# Patient Record
Sex: MALE | Race: BLACK OR AFRICAN AMERICAN | NOT HISPANIC OR LATINO | Employment: FULL TIME | URBAN - METROPOLITAN AREA
[De-identification: names, ages, dates, MRNs, and addresses within clinical notes are randomized per-mention and may not be internally consistent; named-entity substitution may affect disease eponyms.]

---

## 2017-05-12 RX ORDER — AMLODIPINE BESYLATE 5 MG/1
5 TABLET ORAL DAILY
Qty: 90 TABLET | Refills: 3 | Status: SHIPPED | OUTPATIENT
Start: 2017-05-12 | End: 2017-12-18

## 2017-12-01 DIAGNOSIS — Z12.11 COLON CANCER SCREENING: ICD-10-CM

## 2017-12-18 ENCOUNTER — OFFICE VISIT (OUTPATIENT)
Dept: FAMILY MEDICINE | Facility: CLINIC | Age: 52
End: 2017-12-18
Payer: COMMERCIAL

## 2017-12-18 VITALS
TEMPERATURE: 98 F | SYSTOLIC BLOOD PRESSURE: 144 MMHG | HEIGHT: 74 IN | HEART RATE: 76 BPM | DIASTOLIC BLOOD PRESSURE: 80 MMHG | OXYGEN SATURATION: 99 %

## 2017-12-18 DIAGNOSIS — R73.9 HYPERGLYCEMIA: ICD-10-CM

## 2017-12-18 DIAGNOSIS — N52.9 ERECTILE DYSFUNCTION, UNSPECIFIED ERECTILE DYSFUNCTION TYPE: ICD-10-CM

## 2017-12-18 DIAGNOSIS — Z00.00 WELLNESS EXAMINATION: Primary | ICD-10-CM

## 2017-12-18 DIAGNOSIS — I10 HYPERTENSION, UNSPECIFIED TYPE: ICD-10-CM

## 2017-12-18 DIAGNOSIS — Z80.42 FAMILY HISTORY OF PROSTATE CANCER: ICD-10-CM

## 2017-12-18 PROCEDURE — 99396 PREV VISIT EST AGE 40-64: CPT | Mod: 25,S$GLB,, | Performed by: FAMILY MEDICINE

## 2017-12-18 PROCEDURE — 90715 TDAP VACCINE 7 YRS/> IM: CPT | Mod: S$GLB,,, | Performed by: FAMILY MEDICINE

## 2017-12-18 PROCEDURE — 90471 IMMUNIZATION ADMIN: CPT | Mod: S$GLB,,, | Performed by: FAMILY MEDICINE

## 2017-12-18 PROCEDURE — 90472 IMMUNIZATION ADMIN EACH ADD: CPT | Mod: S$GLB,,, | Performed by: FAMILY MEDICINE

## 2017-12-18 PROCEDURE — 90686 IIV4 VACC NO PRSV 0.5 ML IM: CPT | Mod: S$GLB,,, | Performed by: FAMILY MEDICINE

## 2017-12-18 RX ORDER — AMLODIPINE BESYLATE 5 MG/1
5 TABLET ORAL DAILY
COMMUNITY
End: 2018-06-19

## 2017-12-18 RX ORDER — SILDENAFIL 100 MG/1
100 TABLET, FILM COATED ORAL DAILY PRN
Qty: 30 TABLET | Refills: 2 | Status: SHIPPED | OUTPATIENT
Start: 2017-12-18 | End: 2019-03-25 | Stop reason: SDUPTHER

## 2017-12-18 RX ORDER — MOMETASONE FUROATE 1 MG/ML
SOLUTION TOPICAL DAILY
Qty: 60 ML | Refills: 11 | Status: SHIPPED | OUTPATIENT
Start: 2017-12-18 | End: 2018-04-24 | Stop reason: SDUPTHER

## 2017-12-18 NOTE — PROGRESS NOTES
Subjective:      Patient ID: Kike Swartz is a 52 y.o. male.    Chief Complaint: Annual Exam; Hip Pain (X 1 week); and Health Risk Assessment (Due: flu & tetanus vaccine, Colonscopy )    Wellness, rash and needs to refill Rx from dermatologist, cream helped, not engouh, right groin pain, needs prostate checked, Dad had cancer; can't keep erection;       Hip Pain        Review of Systems   Constitutional: Negative for appetite change, fatigue, fever and unexpected weight change.   HENT: Negative for congestion, ear pain, sinus pressure and sore throat.    Eyes: Negative for pain and visual disturbance.   Respiratory: Negative for shortness of breath.    Cardiovascular: Negative for chest pain.   Gastrointestinal: Negative for abdominal pain, constipation and diarrhea.   Endocrine: Negative for polyuria.   Genitourinary: Negative for difficulty urinating and frequency.        ED   Musculoskeletal: Negative for arthralgias, back pain and myalgias.   Skin: Positive for rash. Negative for color change.   Allergic/Immunologic: Negative.    Neurological: Negative for syncope, weakness and headaches.   Hematological: Does not bruise/bleed easily.   Psychiatric/Behavioral: Negative for dysphoric mood and suicidal ideas. The patient is not nervous/anxious.    All other systems reviewed and are negative.    Objective:     Physical Exam   Constitutional: He is oriented to person, place, and time. He appears well-developed and well-nourished. No distress.   HENT:   Head: Normocephalic and atraumatic.   Right Ear: External ear normal.   Left Ear: External ear normal.   Mouth/Throat: Oropharynx is clear and moist. No oropharyngeal exudate.   Eyes: Conjunctivae and EOM are normal. Pupils are equal, round, and reactive to light. Right eye exhibits no discharge. Left eye exhibits no discharge. No scleral icterus.   Neck: Normal range of motion. Neck supple. No JVD present. No tracheal deviation present. No thyromegaly present.    Cardiovascular: Normal rate and regular rhythm.  Exam reveals no gallop and no friction rub.    No murmur heard.  Pulmonary/Chest: Effort normal and breath sounds normal. No stridor. No respiratory distress. He has no wheezes. He has no rales. He exhibits no tenderness.   Abdominal: Soft. He exhibits no distension and no mass. There is no tenderness. There is no rebound and no guarding.   Musculoskeletal: Normal range of motion. He exhibits no edema or tenderness.   Lymphadenopathy:     He has no cervical adenopathy.   Neurological: He is alert and oriented to person, place, and time. He has normal reflexes. He displays normal reflexes. No cranial nerve deficit. He exhibits normal muscle tone. Coordination normal.   Skin: Skin is warm and dry. No rash noted. He is not diaphoretic. No erythema. No pallor.   Hyperpigmented right groin rash   Psychiatric: He has a normal mood and affect. His behavior is normal. Judgment and thought content normal.   Nursing note and vitals reviewed.    Assessment:     1. Wellness examination    2. Hypertension, unspecified type    3. Hyperglycemia    4. Erectile dysfunction, unspecified erectile dysfunction type    5. Family history of prostate cancer      Plan:     New Prescriptions    ASPIRIN (ECOTRIN) 81 MG EC TABLET    Take 1 tablet (81 mg total) by mouth once daily.    ATORVASTATIN (LIPITOR) 10 MG TABLET    Take 1 tablet (10 mg total) by mouth once daily.    CIPROFLOXACIN HCL (CIPRO) 500 MG TABLET    Take 1 tablet (500 mg total) by mouth 2 (two) times daily.    METFORMIN (GLUCOPHAGE) 500 MG TABLET    Take 1 tablet (500 mg total) by mouth daily with breakfast.    MOMETASONE (ELOCON) 0.1 % LOTION    Apply topically once daily.    SILDENAFIL (VIAGRA) 100 MG TABLET    Take 1 tablet (100 mg total) by mouth daily as needed for Erectile Dysfunction.     Discontinued Medications    AMLODIPINE (NORVASC) 5 MG TABLET    Take 1 tablet (5 mg total) by mouth once daily.     Modified  Medications    No medications on file       Wellness examination  -     CBC auto differential; Future; Expected date: 12/18/2017  -     Comprehensive metabolic panel; Future; Expected date: 12/18/2017  -     Hemoglobin A1c; Future  -     Lipid panel; Future  -     PSA, Screening; Future  -     TSH; Future  -     Urinalysis; Future    Hypertension, unspecified type  -     CBC auto differential; Future; Expected date: 12/18/2017  -     Comprehensive metabolic panel; Future; Expected date: 12/18/2017  -     Hemoglobin A1c; Future  -     Lipid panel; Future  -     PSA, Screening; Future  -     TSH; Future  -     Urinalysis; Future    Hyperglycemia  -     CBC auto differential; Future; Expected date: 12/18/2017  -     Comprehensive metabolic panel; Future; Expected date: 12/18/2017  -     Hemoglobin A1c; Future  -     Lipid panel; Future  -     PSA, Screening; Future  -     TSH; Future  -     Urinalysis; Future    Erectile dysfunction, unspecified erectile dysfunction type    Family history of prostate cancer    Other orders  -     sildenafil (VIAGRA) 100 MG tablet; Take 1 tablet (100 mg total) by mouth daily as needed for Erectile Dysfunction.  Dispense: 30 tablet; Refill: 2  -     mometasone (ELOCON) 0.1 % lotion; Apply topically once daily.  Dispense: 60 mL; Refill: 11  -     Influenza - Quadrivalent (3 years & older) (PF)  -     Tdap Vaccine

## 2017-12-27 ENCOUNTER — TELEPHONE (OUTPATIENT)
Dept: FAMILY MEDICINE | Facility: CLINIC | Age: 52
End: 2017-12-27

## 2017-12-27 ENCOUNTER — LAB VISIT (OUTPATIENT)
Dept: LAB | Facility: HOSPITAL | Age: 52
End: 2017-12-27
Attending: FAMILY MEDICINE
Payer: COMMERCIAL

## 2017-12-27 DIAGNOSIS — R73.9 HYPERGLYCEMIA: ICD-10-CM

## 2017-12-27 DIAGNOSIS — I10 HYPERTENSION, UNSPECIFIED TYPE: ICD-10-CM

## 2017-12-27 DIAGNOSIS — Z00.00 WELLNESS EXAMINATION: ICD-10-CM

## 2017-12-27 LAB
ALBUMIN SERPL BCP-MCNC: 4.7 G/DL
ALP SERPL-CCNC: 82 U/L
ALT SERPL W/O P-5'-P-CCNC: 33 U/L
ANION GAP SERPL CALC-SCNC: 11 MMOL/L
AST SERPL-CCNC: 29 U/L
BASOPHILS # BLD AUTO: 0.03 K/UL
BASOPHILS NFR BLD: 0.7 %
BILIRUB SERPL-MCNC: 0.5 MG/DL
BUN SERPL-MCNC: 15 MG/DL
CALCIUM SERPL-MCNC: 9.6 MG/DL
CHLORIDE SERPL-SCNC: 100 MMOL/L
CHOLEST SERPL-MCNC: 253 MG/DL
CHOLEST/HDLC SERPL: 3 {RATIO}
CO2 SERPL-SCNC: 32 MMOL/L
COMPLEXED PSA SERPL-MCNC: 2 NG/ML
CREAT SERPL-MCNC: 0.95 MG/DL
DIFFERENTIAL METHOD: ABNORMAL
EOSINOPHIL # BLD AUTO: 0.2 K/UL
EOSINOPHIL NFR BLD: 5.5 %
ERYTHROCYTE [DISTWIDTH] IN BLOOD BY AUTOMATED COUNT: 13.3 %
EST. GFR  (AFRICAN AMERICAN): >60 ML/MIN/1.73 M^2
EST. GFR  (NON AFRICAN AMERICAN): >60 ML/MIN/1.73 M^2
ESTIMATED AVG GLUCOSE: 123 MG/DL
GLUCOSE SERPL-MCNC: 99 MG/DL
HBA1C MFR BLD HPLC: 5.9 %
HCT VFR BLD AUTO: 40.4 %
HDLC SERPL-MCNC: 83 MG/DL
HDLC SERPL: 32.8 %
HGB BLD-MCNC: 13.3 G/DL
LDLC SERPL CALC-MCNC: 154.6 MG/DL
LYMPHOCYTES # BLD AUTO: 1.4 K/UL
LYMPHOCYTES NFR BLD: 32.7 %
MCH RBC QN AUTO: 30 PG
MCHC RBC AUTO-ENTMCNC: 32.9 G/DL
MCV RBC AUTO: 91 FL
MONOCYTES # BLD AUTO: 0.5 K/UL
MONOCYTES NFR BLD: 10.5 %
NEUTROPHILS # BLD AUTO: 2.2 K/UL
NEUTROPHILS NFR BLD: 50.6 %
NONHDLC SERPL-MCNC: 170 MG/DL
PLATELET # BLD AUTO: 264 K/UL
PMV BLD AUTO: 10.5 FL
POTASSIUM SERPL-SCNC: 4.4 MMOL/L
PROT SERPL-MCNC: 8.4 G/DL
RBC # BLD AUTO: 4.43 M/UL
SODIUM SERPL-SCNC: 143 MMOL/L
TRIGL SERPL-MCNC: 77 MG/DL
TSH SERPL DL<=0.005 MIU/L-ACNC: 1.59 UIU/ML
WBC # BLD AUTO: 4.4 K/UL

## 2017-12-27 PROCEDURE — 85025 COMPLETE CBC W/AUTO DIFF WBC: CPT | Mod: PO

## 2017-12-27 PROCEDURE — 80061 LIPID PANEL: CPT

## 2017-12-27 PROCEDURE — 80053 COMPREHEN METABOLIC PANEL: CPT | Mod: PO

## 2017-12-27 PROCEDURE — 83036 HEMOGLOBIN GLYCOSYLATED A1C: CPT | Mod: PO

## 2017-12-27 PROCEDURE — 84443 ASSAY THYROID STIM HORMONE: CPT | Mod: PO

## 2017-12-27 PROCEDURE — 36415 COLL VENOUS BLD VENIPUNCTURE: CPT | Mod: PO

## 2017-12-27 PROCEDURE — 84153 ASSAY OF PSA TOTAL: CPT

## 2017-12-27 RX ORDER — ATORVASTATIN CALCIUM 10 MG/1
10 TABLET, FILM COATED ORAL DAILY
Qty: 90 TABLET | Refills: 3 | Status: SHIPPED | OUTPATIENT
Start: 2017-12-27 | End: 2019-09-25

## 2017-12-27 RX ORDER — METFORMIN HYDROCHLORIDE 500 MG/1
500 TABLET ORAL
Qty: 90 TABLET | Refills: 3 | Status: SHIPPED | OUTPATIENT
Start: 2017-12-27 | End: 2021-09-27 | Stop reason: SDUPTHER

## 2017-12-27 RX ORDER — CIPROFLOXACIN 500 MG/1
500 TABLET ORAL 2 TIMES DAILY
Qty: 20 TABLET | Refills: 0 | Status: SHIPPED | OUTPATIENT
Start: 2017-12-27 | End: 2018-01-06

## 2017-12-27 RX ORDER — ASPIRIN 81 MG/1
81 TABLET ORAL DAILY
Qty: 100 TABLET | Refills: 12 | COMMUNITY
Start: 2017-12-27 | End: 2023-11-15

## 2017-12-28 ENCOUNTER — TELEPHONE (OUTPATIENT)
Dept: INTERNAL MEDICINE | Facility: CLINIC | Age: 52
End: 2017-12-28

## 2017-12-28 PROBLEM — N52.9 ERECTILE DYSFUNCTION: Status: ACTIVE | Noted: 2017-12-28

## 2017-12-28 PROBLEM — R73.9 HYPERGLYCEMIA: Status: ACTIVE | Noted: 2017-12-28

## 2017-12-28 PROBLEM — I10 HYPERTENSION: Status: ACTIVE | Noted: 2017-12-28

## 2017-12-28 PROBLEM — Z80.42 FAMILY HISTORY OF PROSTATE CANCER: Status: ACTIVE | Noted: 2017-12-28

## 2017-12-28 NOTE — TELEPHONE ENCOUNTER
----- Message from Laith Marie MD sent at 12/27/2017  9:56 PM CST -----  Slight urine infection; sending antibiotic to pharmacy

## 2017-12-28 NOTE — TELEPHONE ENCOUNTER
Patient returned call. Advised of infection in urine & that Rx sent to pharmacy. Patient understood

## 2017-12-28 NOTE — TELEPHONE ENCOUNTER
Patient has slight urine infection medication called into pharmacy.----- Message from Laith Marie MD sent at 12/27/2017 10:05 PM CST -----  A1C high, has pre diabetes; cholesterol too high; needs to avoid sugar, sweets, white starches and start metformin 500 mg and atorvstatin 10 mg and aspirin 81 mg daily and rpeat labs 6 months

## 2018-03-30 RX ORDER — AMLODIPINE BESYLATE 5 MG/1
TABLET ORAL
Qty: 90 TABLET | Refills: 0 | Status: SHIPPED | OUTPATIENT
Start: 2018-03-30 | End: 2018-06-19

## 2018-04-25 RX ORDER — MOMETASONE FUROATE 1 MG/ML
SOLUTION TOPICAL DAILY
Qty: 60 ML | Refills: 11 | Status: SHIPPED | OUTPATIENT
Start: 2018-04-25 | End: 2018-05-05

## 2018-06-18 DIAGNOSIS — R73.9 HYPERGLYCEMIA: Primary | ICD-10-CM

## 2018-06-18 DIAGNOSIS — E78.5 HYPERLIPIDEMIA, UNSPECIFIED HYPERLIPIDEMIA TYPE: ICD-10-CM

## 2018-06-19 PROBLEM — E78.5 HYPERLIPIDEMIA: Status: ACTIVE | Noted: 2018-06-19

## 2018-06-19 RX ORDER — AMLODIPINE BESYLATE 5 MG/1
TABLET ORAL
Qty: 90 TABLET | Refills: 1 | Status: SHIPPED | OUTPATIENT
Start: 2018-06-19 | End: 2018-11-15 | Stop reason: SDUPTHER

## 2018-11-18 RX ORDER — AMLODIPINE BESYLATE 5 MG/1
5 TABLET ORAL DAILY
Qty: 90 TABLET | Refills: 1 | Status: SHIPPED | OUTPATIENT
Start: 2018-11-18 | End: 2019-05-20 | Stop reason: SDUPTHER

## 2018-11-18 RX ORDER — AMLODIPINE BESYLATE 5 MG/1
5 TABLET ORAL DAILY
Qty: 90 TABLET | Refills: 1 | Status: SHIPPED | OUTPATIENT
Start: 2018-11-18 | End: 2019-02-18 | Stop reason: SDUPTHER

## 2019-02-18 RX ORDER — AMLODIPINE BESYLATE 5 MG/1
5 TABLET ORAL DAILY
Qty: 90 TABLET | Refills: 1 | Status: CANCELLED | OUTPATIENT
Start: 2019-02-18

## 2019-02-19 RX ORDER — AMLODIPINE BESYLATE 5 MG/1
5 TABLET ORAL DAILY
Qty: 90 TABLET | Refills: 0 | Status: SHIPPED | OUTPATIENT
Start: 2019-02-19 | End: 2019-09-18

## 2019-03-25 RX ORDER — SILDENAFIL 100 MG/1
100 TABLET, FILM COATED ORAL DAILY PRN
Qty: 30 TABLET | Refills: 2 | Status: SHIPPED | OUTPATIENT
Start: 2019-03-25 | End: 2019-09-18 | Stop reason: SDUPTHER

## 2019-05-20 NOTE — LETTER
May 21, 2019    Kike Coe[address] [employer]   [facility] [id number]             [facility]  [address] [address]  Phone: [phone]  Fax: [phone] Dear Mr. Swartz:    Our records indicate that it is time for you to return to clinic for evaluation by Dr. Marie. Please contact our office to schedule an appointment.          Sincerely,        Dr. Marie's staff

## 2019-05-21 RX ORDER — AMLODIPINE BESYLATE 5 MG/1
5 TABLET ORAL DAILY
Qty: 90 TABLET | Refills: 0 | Status: SHIPPED | OUTPATIENT
Start: 2019-05-21 | End: 2019-08-20 | Stop reason: SDUPTHER

## 2019-08-21 RX ORDER — AMLODIPINE BESYLATE 5 MG/1
5 TABLET ORAL DAILY
Qty: 90 TABLET | Refills: 0 | Status: SHIPPED | OUTPATIENT
Start: 2019-08-21 | End: 2019-09-18 | Stop reason: SDUPTHER

## 2019-09-04 ENCOUNTER — PATIENT OUTREACH (OUTPATIENT)
Dept: ADMINISTRATIVE | Facility: HOSPITAL | Age: 54
End: 2019-09-04

## 2019-09-18 ENCOUNTER — OFFICE VISIT (OUTPATIENT)
Dept: FAMILY MEDICINE | Facility: CLINIC | Age: 54
End: 2019-09-18
Payer: COMMERCIAL

## 2019-09-18 VITALS
SYSTOLIC BLOOD PRESSURE: 138 MMHG | TEMPERATURE: 98 F | HEIGHT: 74 IN | WEIGHT: 193.25 LBS | OXYGEN SATURATION: 96 % | DIASTOLIC BLOOD PRESSURE: 70 MMHG | HEART RATE: 71 BPM | BODY MASS INDEX: 24.8 KG/M2

## 2019-09-18 DIAGNOSIS — I10 HYPERTENSION, UNSPECIFIED TYPE: ICD-10-CM

## 2019-09-18 DIAGNOSIS — Z23 FLU VACCINE NEED: ICD-10-CM

## 2019-09-18 DIAGNOSIS — N52.9 ERECTILE DYSFUNCTION, UNSPECIFIED ERECTILE DYSFUNCTION TYPE: ICD-10-CM

## 2019-09-18 DIAGNOSIS — Z00.00 WELLNESS EXAMINATION: Primary | ICD-10-CM

## 2019-09-18 DIAGNOSIS — M51.37 DEGENERATION OF INTERVERTEBRAL DISC AT L5-S1 LEVEL: ICD-10-CM

## 2019-09-18 DIAGNOSIS — R73.9 HYPERGLYCEMIA: ICD-10-CM

## 2019-09-18 DIAGNOSIS — Z80.42 FAMILY HISTORY OF PROSTATE CANCER: ICD-10-CM

## 2019-09-18 DIAGNOSIS — R21 RASH: ICD-10-CM

## 2019-09-18 DIAGNOSIS — E78.5 HYPERLIPIDEMIA, UNSPECIFIED HYPERLIPIDEMIA TYPE: ICD-10-CM

## 2019-09-18 PROBLEM — M51.379 DEGENERATION OF INTERVERTEBRAL DISC AT L5-S1 LEVEL: Status: ACTIVE | Noted: 2019-09-18

## 2019-09-18 PROCEDURE — 90686 FLU VACCINE (QUAD) GREATER THAN OR EQUAL TO 3YO PRESERVATIVE FREE IM: ICD-10-PCS | Mod: S$GLB,,, | Performed by: FAMILY MEDICINE

## 2019-09-18 PROCEDURE — 99396 PR PREVENTIVE VISIT,EST,40-64: ICD-10-PCS | Mod: 25,S$GLB,, | Performed by: FAMILY MEDICINE

## 2019-09-18 PROCEDURE — 90471 FLU VACCINE (QUAD) GREATER THAN OR EQUAL TO 3YO PRESERVATIVE FREE IM: ICD-10-PCS | Mod: S$GLB,,, | Performed by: FAMILY MEDICINE

## 2019-09-18 PROCEDURE — 3078F DIAST BP <80 MM HG: CPT | Mod: CPTII,S$GLB,, | Performed by: FAMILY MEDICINE

## 2019-09-18 PROCEDURE — 99396 PREV VISIT EST AGE 40-64: CPT | Mod: 25,S$GLB,, | Performed by: FAMILY MEDICINE

## 2019-09-18 PROCEDURE — 3075F SYST BP GE 130 - 139MM HG: CPT | Mod: CPTII,S$GLB,, | Performed by: FAMILY MEDICINE

## 2019-09-18 PROCEDURE — 90686 IIV4 VACC NO PRSV 0.5 ML IM: CPT | Mod: S$GLB,,, | Performed by: FAMILY MEDICINE

## 2019-09-18 PROCEDURE — 3075F PR MOST RECENT SYSTOLIC BLOOD PRESS GE 130-139MM HG: ICD-10-PCS | Mod: CPTII,S$GLB,, | Performed by: FAMILY MEDICINE

## 2019-09-18 PROCEDURE — 3078F PR MOST RECENT DIASTOLIC BLOOD PRESSURE < 80 MM HG: ICD-10-PCS | Mod: CPTII,S$GLB,, | Performed by: FAMILY MEDICINE

## 2019-09-18 PROCEDURE — 90471 IMMUNIZATION ADMIN: CPT | Mod: S$GLB,,, | Performed by: FAMILY MEDICINE

## 2019-09-18 RX ORDER — AMLODIPINE BESYLATE 5 MG/1
5 TABLET ORAL DAILY
Qty: 90 TABLET | Refills: 3 | Status: SHIPPED | OUTPATIENT
Start: 2019-09-18 | End: 2019-11-21 | Stop reason: SDUPTHER

## 2019-09-18 RX ORDER — MOMETASONE FUROATE 1 MG/G
CREAM TOPICAL DAILY
Qty: 45 TUBE | Refills: 5 | Status: SHIPPED | OUTPATIENT
Start: 2019-09-18 | End: 2019-11-21 | Stop reason: SDUPTHER

## 2019-09-18 RX ORDER — SILDENAFIL 100 MG/1
100 TABLET, FILM COATED ORAL DAILY PRN
Qty: 30 TABLET | Refills: 2 | Status: SHIPPED | OUTPATIENT
Start: 2019-09-18 | End: 2020-04-08 | Stop reason: SDUPTHER

## 2019-09-18 NOTE — PROGRESS NOTES
"Subjective:      Patient ID: Kike Swartz is a 54 y.o. male.    Chief Complaint: wellness      Vitals:    09/18/19 1350   BP: 138/70   Pulse: 71   Temp: 97.7 °F (36.5 °C)   SpO2: 96%   Weight: 87.6 kg (193 lb 3.7 oz)   Height: 6' 2" (1.88 m)        HPI   Wellness; last visit one year ago    Review of Systems   Constitutional: Negative for activity change and unexpected weight change.   HENT: Positive for rhinorrhea. Negative for hearing loss and trouble swallowing.    Eyes: Negative for discharge and visual disturbance.   Respiratory: Negative for chest tightness and wheezing.    Cardiovascular: Negative for chest pain and palpitations.   Gastrointestinal: Negative for blood in stool, constipation, diarrhea and vomiting.   Endocrine: Negative for polydipsia and polyuria.   Genitourinary: Negative for difficulty urinating, hematuria and urgency.   Musculoskeletal: Positive for arthralgias. Negative for joint swelling and neck pain.        Right iliac crest   Skin: Positive for rash.   Neurological: Negative for weakness and headaches.   Psychiatric/Behavioral: Negative for confusion and dysphoric mood.     Objective:     Physical Exam   Constitutional: He is oriented to person, place, and time. He appears well-developed and well-nourished. No distress.   HENT:   Head: Normocephalic and atraumatic.   Right Ear: External ear normal.   Left Ear: External ear normal.   Mouth/Throat: Oropharynx is clear and moist. No oropharyngeal exudate.   Eyes: Pupils are equal, round, and reactive to light. Conjunctivae and EOM are normal. Right eye exhibits no discharge. Left eye exhibits no discharge. No scleral icterus.   Neck: Normal range of motion. Neck supple. No JVD present. No tracheal deviation present. No thyromegaly present.   Cardiovascular: Normal rate and regular rhythm. Exam reveals no gallop and no friction rub.   No murmur heard.  Pulmonary/Chest: Effort normal and breath sounds normal. No stridor. No respiratory " distress. He has no wheezes. He has no rales. He exhibits no tenderness.   Abdominal: Soft. He exhibits no distension and no mass. There is no tenderness. There is no rebound and no guarding.   Musculoskeletal: Normal range of motion. He exhibits no edema or tenderness.   Lymphadenopathy:     He has no cervical adenopathy.   Neurological: He is alert and oriented to person, place, and time. He has normal reflexes. He displays normal reflexes. No cranial nerve deficit. He exhibits normal muscle tone. Coordination normal.   Skin: Skin is warm and dry. No rash noted. He is not diaphoretic. No erythema. No pallor.   Raised rash right prox ant thigh   Psychiatric: He has a normal mood and affect. His behavior is normal. Judgment and thought content normal.   Nursing note and vitals reviewed.    Assessment:     1. Wellness examination    2. Flu vaccine need    3. Hypertension, unspecified type    4. Hyperglycemia    5. Hyperlipidemia, unspecified hyperlipidemia type    6. Family history of prostate cancer    7. Degeneration of intervertebral disc at L5-S1 level    8. Erectile dysfunction, unspecified erectile dysfunction type    9. Rash      Plan:        Medication List           Accurate as of 9/18/19  2:23 PM. If you have any questions, ask your nurse or doctor.               CHANGE how you take these medications    amLODIPine 5 MG tablet  Commonly known as:  NORVASC  Take 1 tablet (5 mg total) by mouth once daily.  What changed:  Another medication with the same name was removed. Continue taking this medication, and follow the directions you see here.  Changed by:  Laith Marie MD     * mometasone 0.1 % solution  Commonly known as:  ELOCON  Apply topically once daily.  What changed:  Another medication with the same name was added. Make sure you understand how and when to take each.  Changed by:  Laith Marie MD     * mometasone 0.1% 0.1 % cream  Commonly known as:  ELOCON  Apply topically once daily.  What  changed:  You were already taking a medication with the same name, and this prescription was added. Make sure you understand how and when to take each.  Changed by:  Laith Marie MD         * This list has 2 medication(s) that are the same as other medications prescribed for you. Read the directions carefully, and ask your doctor or other care provider to review them with you.            CONTINUE taking these medications    aspirin 81 MG EC tablet  Commonly known as:  ECOTRIN  Take 1 tablet (81 mg total) by mouth once daily.     atorvastatin 10 MG tablet  Commonly known as:  LIPITOR  Take 1 tablet (10 mg total) by mouth once daily.     metFORMIN 500 MG tablet  Commonly known as:  GLUCOPHAGE  Take 1 tablet (500 mg total) by mouth daily with breakfast.     sildenafil 100 MG tablet  Commonly known as:  VIAGRA  Take 1 tablet (100 mg total) by mouth daily as needed for Erectile Dysfunction.           Where to Get Your Medications      These medications were sent to [facility] [id number] [facility]  [address] [place] [address]    Phone:  [phone]   · amLODIPine 5 MG tablet  · mometasone 0.1% 0.1 % cream  · sildenafil 100 MG tablet       Wellness examination  -     CBC auto differential; Future; Expected date: 09/18/2019  -     Comprehensive metabolic panel; Future; Expected date: 09/18/2019  -     Lipid panel; Future  -     Urinalysis; Future  -     Vitamin D; Future  -     TSH; Future  -     PSA, Screening; Future  -     Hemoglobin A1c; Future    Flu vaccine need  -     Influenza - Quadrivalent (PF)  -     CBC auto differential; Future; Expected date: 09/18/2019  -     Comprehensive metabolic panel; Future; Expected date: 09/18/2019  -     Lipid panel; Future  -     Urinalysis; Future  -     Vitamin D; Future  -     TSH; Future  -     PSA, Screening; Future  -     Hemoglobin A1c; Future    Hypertension, unspecified type  -     CBC auto differential; Future; Expected date:  09/18/2019  -     Comprehensive metabolic panel; Future; Expected date: 09/18/2019  -     Lipid panel; Future  -     Urinalysis; Future  -     Vitamin D; Future  -     TSH; Future  -     PSA, Screening; Future  -     Hemoglobin A1c; Future    Hyperglycemia  -     CBC auto differential; Future; Expected date: 09/18/2019  -     Comprehensive metabolic panel; Future; Expected date: 09/18/2019  -     Lipid panel; Future  -     Urinalysis; Future  -     Vitamin D; Future  -     TSH; Future  -     PSA, Screening; Future  -     Hemoglobin A1c; Future    Hyperlipidemia, unspecified hyperlipidemia type  -     CBC auto differential; Future; Expected date: 09/18/2019  -     Comprehensive metabolic panel; Future; Expected date: 09/18/2019  -     Lipid panel; Future  -     Urinalysis; Future  -     Vitamin D; Future  -     TSH; Future  -     PSA, Screening; Future  -     Hemoglobin A1c; Future    Family history of prostate cancer  -     CBC auto differential; Future; Expected date: 09/18/2019  -     Comprehensive metabolic panel; Future; Expected date: 09/18/2019  -     Lipid panel; Future  -     Urinalysis; Future  -     Vitamin D; Future  -     TSH; Future  -     PSA, Screening; Future  -     Hemoglobin A1c; Future    Degeneration of intervertebral disc at L5-S1 level    Erectile dysfunction, unspecified erectile dysfunction type    Rash    Other orders  -     amLODIPine (NORVASC) 5 MG tablet; Take 1 tablet (5 mg total) by mouth once daily.  Dispense: 90 tablet; Refill: 3  -     mometasone 0.1% (ELOCON) 0.1 % cream; Apply topically once daily.  Dispense: 45 Tube; Refill: 5  -     sildenafil (VIAGRA) 100 MG tablet; Take 1 tablet (100 mg total) by mouth daily as needed for Erectile Dysfunction.  Dispense: 30 tablet; Refill: 2

## 2019-09-24 ENCOUNTER — LAB VISIT (OUTPATIENT)
Dept: LAB | Facility: HOSPITAL | Age: 54
End: 2019-09-24
Attending: FAMILY MEDICINE
Payer: COMMERCIAL

## 2019-09-24 DIAGNOSIS — I10 HYPERTENSION, UNSPECIFIED TYPE: ICD-10-CM

## 2019-09-24 DIAGNOSIS — Z00.00 WELLNESS EXAMINATION: ICD-10-CM

## 2019-09-24 DIAGNOSIS — E78.5 HYPERLIPIDEMIA, UNSPECIFIED HYPERLIPIDEMIA TYPE: ICD-10-CM

## 2019-09-24 DIAGNOSIS — R73.9 HYPERGLYCEMIA: ICD-10-CM

## 2019-09-24 DIAGNOSIS — Z23 FLU VACCINE NEED: ICD-10-CM

## 2019-09-24 DIAGNOSIS — Z80.42 FAMILY HISTORY OF PROSTATE CANCER: ICD-10-CM

## 2019-09-24 LAB
ALBUMIN SERPL BCP-MCNC: 4.5 G/DL (ref 3.5–5.2)
ALP SERPL-CCNC: 82 U/L (ref 38–126)
ALT SERPL W/O P-5'-P-CCNC: 22 U/L (ref 10–44)
ANION GAP SERPL CALC-SCNC: 8 MMOL/L (ref 8–16)
AST SERPL-CCNC: 30 U/L (ref 15–46)
BASOPHILS # BLD AUTO: 0.02 K/UL (ref 0–0.2)
BASOPHILS NFR BLD: 0.4 % (ref 0–1.9)
BILIRUB SERPL-MCNC: 1 MG/DL (ref 0.1–1)
BUN SERPL-MCNC: 15 MG/DL (ref 2–20)
CALCIUM SERPL-MCNC: 9.6 MG/DL (ref 8.7–10.5)
CHLORIDE SERPL-SCNC: 99 MMOL/L (ref 95–110)
CHOLEST SERPL-MCNC: 231 MG/DL (ref 120–199)
CHOLEST/HDLC SERPL: 3 {RATIO} (ref 2–5)
CO2 SERPL-SCNC: 30 MMOL/L (ref 23–29)
COMPLEXED PSA SERPL-MCNC: 1.2 NG/ML (ref 0–4)
CREAT SERPL-MCNC: 0.94 MG/DL (ref 0.5–1.4)
DIFFERENTIAL METHOD: ABNORMAL
EOSINOPHIL # BLD AUTO: 0.2 K/UL (ref 0–0.5)
EOSINOPHIL NFR BLD: 4.3 % (ref 0–8)
ERYTHROCYTE [DISTWIDTH] IN BLOOD BY AUTOMATED COUNT: 13.6 % (ref 11.5–14.5)
EST. GFR  (AFRICAN AMERICAN): >60 ML/MIN/1.73 M^2
EST. GFR  (NON AFRICAN AMERICAN): >60 ML/MIN/1.73 M^2
ESTIMATED AVG GLUCOSE: 137 MG/DL (ref 68–131)
GLUCOSE SERPL-MCNC: 102 MG/DL (ref 70–110)
HBA1C MFR BLD HPLC: 6.4 % (ref 4–5.6)
HCT VFR BLD AUTO: 41.5 % (ref 40–54)
HDLC SERPL-MCNC: 76 MG/DL (ref 40–75)
HDLC SERPL: 32.9 % (ref 20–50)
HGB BLD-MCNC: 13.3 G/DL (ref 14–18)
LDLC SERPL CALC-MCNC: 140.2 MG/DL (ref 63–159)
LYMPHOCYTES # BLD AUTO: 1.6 K/UL (ref 1–4.8)
LYMPHOCYTES NFR BLD: 33 % (ref 18–48)
MCH RBC QN AUTO: 29.4 PG (ref 27–31)
MCHC RBC AUTO-ENTMCNC: 32 G/DL (ref 32–36)
MCV RBC AUTO: 92 FL (ref 82–98)
MONOCYTES # BLD AUTO: 0.4 K/UL (ref 0.3–1)
MONOCYTES NFR BLD: 9.4 % (ref 4–15)
NEUTROPHILS # BLD AUTO: 2.5 K/UL (ref 1.8–7.7)
NEUTROPHILS NFR BLD: 52.9 % (ref 38–73)
NONHDLC SERPL-MCNC: 155 MG/DL
PLATELET # BLD AUTO: 226 K/UL (ref 150–350)
PMV BLD AUTO: 11 FL (ref 9.2–12.9)
POTASSIUM SERPL-SCNC: 4.1 MMOL/L (ref 3.5–5.1)
PROT SERPL-MCNC: 8.2 G/DL (ref 6–8.4)
RBC # BLD AUTO: 4.53 M/UL (ref 4.6–6.2)
SODIUM SERPL-SCNC: 137 MMOL/L (ref 136–145)
TRIGL SERPL-MCNC: 74 MG/DL (ref 30–150)
TSH SERPL DL<=0.005 MIU/L-ACNC: 1.6 UIU/ML (ref 0.4–4)
WBC # BLD AUTO: 4.7 K/UL (ref 3.9–12.7)

## 2019-09-24 PROCEDURE — 85025 COMPLETE CBC W/AUTO DIFF WBC: CPT | Mod: PO

## 2019-09-24 PROCEDURE — 36415 COLL VENOUS BLD VENIPUNCTURE: CPT | Mod: PO

## 2019-09-24 PROCEDURE — 83036 HEMOGLOBIN GLYCOSYLATED A1C: CPT

## 2019-09-24 PROCEDURE — 84153 ASSAY OF PSA TOTAL: CPT

## 2019-09-24 PROCEDURE — 82306 VITAMIN D 25 HYDROXY: CPT | Mod: PO

## 2019-09-24 PROCEDURE — 80061 LIPID PANEL: CPT

## 2019-09-24 PROCEDURE — 80053 COMPREHEN METABOLIC PANEL: CPT | Mod: PO

## 2019-09-24 PROCEDURE — 84443 ASSAY THYROID STIM HORMONE: CPT | Mod: PO

## 2019-09-25 ENCOUNTER — TELEPHONE (OUTPATIENT)
Dept: FAMILY MEDICINE | Facility: CLINIC | Age: 54
End: 2019-09-25

## 2019-09-25 DIAGNOSIS — E78.5 HYPERLIPIDEMIA, UNSPECIFIED HYPERLIPIDEMIA TYPE: ICD-10-CM

## 2019-09-25 DIAGNOSIS — R73.9 HYPERGLYCEMIA: Primary | ICD-10-CM

## 2019-09-25 LAB — 25(OH)D3+25(OH)D2 SERPL-MCNC: 18 NG/ML (ref 30–96)

## 2019-09-25 RX ORDER — ACETAMINOPHEN 500 MG
5000 TABLET ORAL DAILY
Qty: 90 TABLET | Refills: 3 | Status: SHIPPED | OUTPATIENT
Start: 2019-09-25

## 2019-09-25 RX ORDER — ATORVASTATIN CALCIUM 20 MG/1
20 TABLET, FILM COATED ORAL DAILY
Qty: 90 TABLET | Refills: 3 | Status: SHIPPED | OUTPATIENT
Start: 2019-09-25 | End: 2020-09-26 | Stop reason: SDUPTHER

## 2019-09-25 NOTE — TELEPHONE ENCOUNTER
----- Message from Laith Marie MD sent at 9/25/2019  6:52 AM CDT -----  Vit D low; start Rx; sent to pharm; Diabetes still good; repeat Diabetes test 6 months; cholesterol too high; increasing atorvastatin to 20 mg nightly; repeat that in 3 months

## 2019-11-22 RX ORDER — AMLODIPINE BESYLATE 5 MG/1
5 TABLET ORAL DAILY
Qty: 90 TABLET | Refills: 3 | Status: SHIPPED | OUTPATIENT
Start: 2019-11-22 | End: 2020-05-12 | Stop reason: SDUPTHER

## 2019-11-22 RX ORDER — MOMETASONE FUROATE 1 MG/G
CREAM TOPICAL DAILY
Qty: 45 TUBE | Refills: 5 | Status: SHIPPED | OUTPATIENT
Start: 2019-11-22 | End: 2020-04-07 | Stop reason: SDUPTHER

## 2020-04-08 RX ORDER — MOMETASONE FUROATE 1 MG/G
CREAM TOPICAL DAILY
Qty: 45 TUBE | Refills: 5 | Status: SHIPPED | OUTPATIENT
Start: 2020-04-08 | End: 2021-04-06 | Stop reason: SDUPTHER

## 2020-04-09 RX ORDER — SILDENAFIL 100 MG/1
100 TABLET, FILM COATED ORAL DAILY PRN
Qty: 30 TABLET | Refills: 2 | Status: SHIPPED | OUTPATIENT
Start: 2020-04-09 | End: 2020-04-13 | Stop reason: SDUPTHER

## 2020-04-14 RX ORDER — SILDENAFIL 100 MG/1
100 TABLET, FILM COATED ORAL DAILY PRN
Qty: 30 TABLET | Refills: 2 | Status: SHIPPED | OUTPATIENT
Start: 2020-04-14 | End: 2020-09-23 | Stop reason: SDUPTHER

## 2020-05-12 RX ORDER — AMLODIPINE BESYLATE 5 MG/1
5 TABLET ORAL DAILY
Qty: 90 TABLET | Refills: 3 | Status: SHIPPED | OUTPATIENT
Start: 2020-05-12 | End: 2020-09-23

## 2020-09-23 ENCOUNTER — PATIENT MESSAGE (OUTPATIENT)
Dept: ADMINISTRATIVE | Facility: OTHER | Age: 55
End: 2020-09-23

## 2020-09-23 ENCOUNTER — LAB VISIT (OUTPATIENT)
Dept: LAB | Facility: HOSPITAL | Age: 55
End: 2020-09-23
Attending: FAMILY MEDICINE
Payer: COMMERCIAL

## 2020-09-23 ENCOUNTER — OFFICE VISIT (OUTPATIENT)
Dept: FAMILY MEDICINE | Facility: CLINIC | Age: 55
End: 2020-09-23
Payer: COMMERCIAL

## 2020-09-23 VITALS
WEIGHT: 193.69 LBS | BODY MASS INDEX: 24.86 KG/M2 | TEMPERATURE: 97 F | OXYGEN SATURATION: 99 % | HEIGHT: 74 IN | DIASTOLIC BLOOD PRESSURE: 82 MMHG | SYSTOLIC BLOOD PRESSURE: 154 MMHG | HEART RATE: 61 BPM

## 2020-09-23 DIAGNOSIS — I10 HYPERTENSION, UNSPECIFIED TYPE: ICD-10-CM

## 2020-09-23 DIAGNOSIS — R40.0 DAYTIME SLEEPINESS: ICD-10-CM

## 2020-09-23 DIAGNOSIS — R06.83 SNORES: ICD-10-CM

## 2020-09-23 DIAGNOSIS — Z00.00 WELLNESS EXAMINATION: ICD-10-CM

## 2020-09-23 DIAGNOSIS — R21 RASH: ICD-10-CM

## 2020-09-23 DIAGNOSIS — R73.9 HYPERGLYCEMIA: ICD-10-CM

## 2020-09-23 DIAGNOSIS — Z80.42 FAMILY HISTORY OF PROSTATE CANCER: ICD-10-CM

## 2020-09-23 DIAGNOSIS — Z00.00 WELLNESS EXAMINATION: Primary | ICD-10-CM

## 2020-09-23 DIAGNOSIS — E78.5 HYPERLIPIDEMIA, UNSPECIFIED HYPERLIPIDEMIA TYPE: ICD-10-CM

## 2020-09-23 DIAGNOSIS — M51.37 DEGENERATION OF INTERVERTEBRAL DISC AT L5-S1 LEVEL: ICD-10-CM

## 2020-09-23 DIAGNOSIS — R53.83 FATIGUE, UNSPECIFIED TYPE: ICD-10-CM

## 2020-09-23 DIAGNOSIS — N52.9 ERECTILE DYSFUNCTION, UNSPECIFIED ERECTILE DYSFUNCTION TYPE: ICD-10-CM

## 2020-09-23 LAB
25(OH)D3+25(OH)D2 SERPL-MCNC: 33 NG/ML (ref 30–96)
ALBUMIN SERPL BCP-MCNC: 4.6 G/DL (ref 3.5–5.2)
ALP SERPL-CCNC: 66 U/L (ref 38–126)
ALT SERPL W/O P-5'-P-CCNC: 25 U/L (ref 10–44)
ANION GAP SERPL CALC-SCNC: 8 MMOL/L (ref 8–16)
AST SERPL-CCNC: 36 U/L (ref 15–46)
BASOPHILS # BLD AUTO: 0.04 K/UL (ref 0–0.2)
BASOPHILS NFR BLD: 0.9 % (ref 0–1.9)
BILIRUB SERPL-MCNC: 0.8 MG/DL (ref 0.1–1)
BUN SERPL-MCNC: 13 MG/DL (ref 2–20)
CALCIUM SERPL-MCNC: 9.5 MG/DL (ref 8.7–10.5)
CHLORIDE SERPL-SCNC: 101 MMOL/L (ref 95–110)
CHOLEST SERPL-MCNC: 231 MG/DL (ref 120–199)
CHOLEST/HDLC SERPL: 3.1 {RATIO} (ref 2–5)
CO2 SERPL-SCNC: 31 MMOL/L (ref 23–29)
COMPLEXED PSA SERPL-MCNC: 0.97 NG/ML (ref 0–4)
CREAT SERPL-MCNC: 0.84 MG/DL (ref 0.5–1.4)
DIFFERENTIAL METHOD: ABNORMAL
EOSINOPHIL # BLD AUTO: 0.2 K/UL (ref 0–0.5)
EOSINOPHIL NFR BLD: 4 % (ref 0–8)
ERYTHROCYTE [DISTWIDTH] IN BLOOD BY AUTOMATED COUNT: 12.8 % (ref 11.5–14.5)
EST. GFR  (AFRICAN AMERICAN): >60 ML/MIN/1.73 M^2
EST. GFR  (NON AFRICAN AMERICAN): >60 ML/MIN/1.73 M^2
ESTIMATED AVG GLUCOSE: 131 MG/DL (ref 68–131)
GLUCOSE SERPL-MCNC: 114 MG/DL (ref 70–110)
HBA1C MFR BLD HPLC: 6.2 % (ref 4–5.6)
HCT VFR BLD AUTO: 41.4 % (ref 40–54)
HDLC SERPL-MCNC: 75 MG/DL (ref 40–75)
HDLC SERPL: 32.5 % (ref 20–50)
HGB BLD-MCNC: 13.2 G/DL (ref 14–18)
IMM GRANULOCYTES # BLD AUTO: 0.01 K/UL (ref 0–0.04)
IMM GRANULOCYTES NFR BLD AUTO: 0.2 % (ref 0–0.5)
LDLC SERPL CALC-MCNC: 140.8 MG/DL (ref 63–159)
LYMPHOCYTES # BLD AUTO: 1.4 K/UL (ref 1–4.8)
LYMPHOCYTES NFR BLD: 31.3 % (ref 18–48)
MCH RBC QN AUTO: 29.5 PG (ref 27–31)
MCHC RBC AUTO-ENTMCNC: 31.9 G/DL (ref 32–36)
MCV RBC AUTO: 93 FL (ref 82–98)
MONOCYTES # BLD AUTO: 0.4 K/UL (ref 0.3–1)
MONOCYTES NFR BLD: 9.7 % (ref 4–15)
NEUTROPHILS # BLD AUTO: 2.4 K/UL (ref 1.8–7.7)
NEUTROPHILS NFR BLD: 53.9 % (ref 38–73)
NONHDLC SERPL-MCNC: 156 MG/DL
NRBC BLD-RTO: 0 /100 WBC
PLATELET # BLD AUTO: 244 K/UL (ref 150–350)
PMV BLD AUTO: 10.8 FL (ref 9.2–12.9)
POTASSIUM SERPL-SCNC: 4.6 MMOL/L (ref 3.5–5.1)
PROT SERPL-MCNC: 8.3 G/DL (ref 6–8.4)
RBC # BLD AUTO: 4.47 M/UL (ref 4.6–6.2)
SODIUM SERPL-SCNC: 140 MMOL/L (ref 136–145)
TRIGL SERPL-MCNC: 76 MG/DL (ref 30–150)
TSH SERPL DL<=0.005 MIU/L-ACNC: 2.14 UIU/ML (ref 0.4–4)
WBC # BLD AUTO: 4.53 K/UL (ref 3.9–12.7)

## 2020-09-23 PROCEDURE — 99396 PREV VISIT EST AGE 40-64: CPT | Mod: S$GLB,,, | Performed by: FAMILY MEDICINE

## 2020-09-23 PROCEDURE — 84443 ASSAY THYROID STIM HORMONE: CPT | Mod: PO

## 2020-09-23 PROCEDURE — 3077F SYST BP >= 140 MM HG: CPT | Mod: CPTII,S$GLB,, | Performed by: FAMILY MEDICINE

## 2020-09-23 PROCEDURE — 3079F DIAST BP 80-89 MM HG: CPT | Mod: CPTII,S$GLB,, | Performed by: FAMILY MEDICINE

## 2020-09-23 PROCEDURE — 80053 COMPREHEN METABOLIC PANEL: CPT | Mod: PO

## 2020-09-23 PROCEDURE — 82306 VITAMIN D 25 HYDROXY: CPT | Mod: PO

## 2020-09-23 PROCEDURE — 83036 HEMOGLOBIN GLYCOSYLATED A1C: CPT

## 2020-09-23 PROCEDURE — 3079F PR MOST RECENT DIASTOLIC BLOOD PRESSURE 80-89 MM HG: ICD-10-PCS | Mod: CPTII,S$GLB,, | Performed by: FAMILY MEDICINE

## 2020-09-23 PROCEDURE — 3077F PR MOST RECENT SYSTOLIC BLOOD PRESSURE >= 140 MM HG: ICD-10-PCS | Mod: CPTII,S$GLB,, | Performed by: FAMILY MEDICINE

## 2020-09-23 PROCEDURE — 80061 LIPID PANEL: CPT

## 2020-09-23 PROCEDURE — 84153 ASSAY OF PSA TOTAL: CPT

## 2020-09-23 PROCEDURE — 3008F PR BODY MASS INDEX (BMI) DOCUMENTED: ICD-10-PCS | Mod: CPTII,S$GLB,, | Performed by: FAMILY MEDICINE

## 2020-09-23 PROCEDURE — 36415 COLL VENOUS BLD VENIPUNCTURE: CPT | Mod: PO

## 2020-09-23 PROCEDURE — 3008F BODY MASS INDEX DOCD: CPT | Mod: CPTII,S$GLB,, | Performed by: FAMILY MEDICINE

## 2020-09-23 PROCEDURE — 99396 PR PREVENTIVE VISIT,EST,40-64: ICD-10-PCS | Mod: S$GLB,,, | Performed by: FAMILY MEDICINE

## 2020-09-23 PROCEDURE — 85025 COMPLETE CBC W/AUTO DIFF WBC: CPT | Mod: PO

## 2020-09-23 RX ORDER — SILDENAFIL 100 MG/1
100 TABLET, FILM COATED ORAL DAILY PRN
Qty: 30 TABLET | Refills: 2 | Status: SHIPPED | OUTPATIENT
Start: 2020-09-23 | End: 2020-09-23 | Stop reason: SDUPTHER

## 2020-09-23 RX ORDER — AMOXICILLIN 500 MG
CAPSULE ORAL DAILY
COMMUNITY

## 2020-09-23 RX ORDER — AMLODIPINE BESYLATE 10 MG/1
10 TABLET ORAL DAILY
Qty: 90 TABLET | Refills: 3 | Status: SHIPPED | OUTPATIENT
Start: 2020-09-23 | End: 2021-09-27 | Stop reason: SDUPTHER

## 2020-09-23 NOTE — PROGRESS NOTES
"Subjective:      Patient ID: Kike Swartz is a 55 y.o. male.    Chief Complaint: Annual Exam      Vitals:    09/23/20 0845   BP: (!) 154/82   Pulse: 61   Temp: 97.2 °F (36.2 °C)   TempSrc: Temporal   SpO2: 99%   Weight: 87.9 kg (193 lb 10.8 oz)   Height: 6' 2" (1.88 m)        HPI   Check up, yearly  ; had Covid in March with wife; bp up lateley, lost 10 pounds on purpose, rash on scalp and wrist, clears with monitstat, comes back  Suspects has sleep apnea  [id number] in BP tx trial  On furlough from [facility]    Problem List  Patient Active Problem List   Diagnosis    Hypertension    Hyperglycemia    Erectile dysfunction    Family history of prostate cancer    Hyperlipidemia    Rash    Degeneration of intervertebral disc at L5-S1 level        ALLERGIES:   Review of patient's allergies indicates:   Allergen Reactions    Ace inhibitors      angioedema       MEDS:   Current Outpatient Medications:     amLODIPine (NORVASC) 10 MG tablet, Take 1 tablet (10 mg total) by mouth once daily., Disp: 90 tablet, Rfl: 3    GARLIC ORAL, Take by mouth., Disp: , Rfl:     mometasone 0.1% (ELOCON) 0.1 % cream, Apply topically once daily., Disp: 45 Tube, Rfl: 5    omega-3 fatty acids/fish oil (FISH OIL-OMEGA-3 FATTY ACIDS) 300-1,000 mg capsule, Take by mouth once daily., Disp: , Rfl:     sildenafiL (VIAGRA) 100 MG tablet, Take 1 tablet (100 mg total) by mouth daily as needed for Erectile Dysfunction., Disp: 30 tablet, Rfl: 2    aspirin (ECOTRIN) 81 MG EC tablet, Take 1 tablet (81 mg total) by mouth once daily., Disp: 100 tablet, Rfl: 12    atorvastatin (LIPITOR) 20 MG tablet, Take 1 tablet (20 mg total) by mouth once daily. At night for cholesterol (Patient not taking: Reported on 9/23/2020), Disp: 90 tablet, Rfl: 3    cholecalciferol, vitamin D3, (VITAMIN D3) 5,000 unit Tab, Take 1 tablet (5,000 Units total) by mouth once daily. (Patient not taking: Reported on 9/23/2020), Disp: 90 tablet, Rfl: 3    " metFORMIN (GLUCOPHAGE) 500 MG tablet, Take 1 tablet (500 mg total) by mouth daily with breakfast., Disp: 90 tablet, Rfl: 3      History:  Current Providers as of 2020  PCP: Laith Marie MD  Care Team Provider: Ned Olivera LPN  Care Team Provider: Ned Olivera LPN  Encounter Provider: Laith Marie MD, starting on Wed Sep 23, 2020 12:00 AM  Referring Provider: not found, starting on Wed Sep 23, 2020 12:00 AM  Consulting Physician: Laith Marie MD, starting on Wed Sep 23, 2020  8:43 AM (Active)   Past Medical History:   Diagnosis Date    Hypertension      Past Surgical History:   Procedure Laterality Date    COLONOSCOPY      Dr Marin      Social History     Tobacco Use    Smoking status: Former Smoker     Packs/day: 1.00     Years: 20.00     Pack years: 20.00     Types: Cigarettes     Quit date: 2000     Years since quittin.7   Substance Use Topics    Alcohol use: Yes     Alcohol/week: 30.0 standard drinks     Types: 30 Cans of beer per week     Frequency: 2-3 times a week     Drinks per session: 7 to 9     Binge frequency: Weekly    Drug use: No         Review of Systems   Constitutional: Negative for appetite change, fatigue, fever and unexpected weight change.   HENT: Negative for congestion, ear pain, sinus pressure and sore throat.    Eyes: Negative for pain and visual disturbance.   Respiratory: Negative for shortness of breath.    Cardiovascular: Negative for chest pain.   Gastrointestinal: Negative for abdominal pain, constipation and diarrhea.   Endocrine: Negative for polyuria.   Genitourinary: Negative for difficulty urinating and frequency.   Musculoskeletal: Negative for arthralgias, back pain and myalgias.   Skin: Negative for color change.   Allergic/Immunologic: Negative.    Neurological: Negative for syncope, weakness and headaches.   Hematological: Does not bruise/bleed easily.   Psychiatric/Behavioral: Negative for dysphoric mood and suicidal ideas. The  patient is not nervous/anxious.    All other systems reviewed and are negative.    Objective:     Physical Exam  Vitals signs and nursing note reviewed.   Constitutional:       General: He is not in acute distress.     Appearance: He is well-developed. He is not diaphoretic.   HENT:      Head: Normocephalic and atraumatic.      Right Ear: External ear normal.      Left Ear: External ear normal.      Mouth/Throat:      Pharynx: No oropharyngeal exudate.   Eyes:      General: No scleral icterus.        Right eye: No discharge.         Left eye: No discharge.      Conjunctiva/sclera: Conjunctivae normal.      Pupils: Pupils are equal, round, and reactive to light.   Neck:      Musculoskeletal: Normal range of motion and neck supple.      Thyroid: No thyromegaly.      Vascular: No JVD.      Trachea: No tracheal deviation.   Cardiovascular:      Rate and Rhythm: Normal rate and regular rhythm.      Heart sounds: No murmur. No friction rub. No gallop.    Pulmonary:      Effort: Pulmonary effort is normal. No respiratory distress.      Breath sounds: Normal breath sounds. No stridor. No wheezing or rales.   Chest:      Chest wall: No tenderness.   Abdominal:      General: There is no distension.      Palpations: Abdomen is soft. There is no mass.      Tenderness: There is no abdominal tenderness. There is no guarding or rebound.   Musculoskeletal: Normal range of motion.         General: No tenderness.   Lymphadenopathy:      Cervical: No cervical adenopathy.   Skin:     General: Skin is warm and dry.      Coloration: Skin is not pale.      Findings: No erythema or rash.   Neurological:      Mental Status: He is alert and oriented to person, place, and time.      Cranial Nerves: No cranial nerve deficit.      Motor: No abnormal muscle tone.      Coordination: Coordination normal.      Deep Tendon Reflexes: Reflexes are normal and symmetric. Reflexes normal.   Psychiatric:         Behavior: Behavior normal.         Thought  Content: Thought content normal.         Judgment: Judgment normal.             Assessment:     1. Wellness examination    2. Degeneration of intervertebral disc at L5-S1 level    3. Rash    4. Hyperlipidemia, unspecified hyperlipidemia type    5. Hypertension, unspecified type    6. Erectile dysfunction, unspecified erectile dysfunction type    7. Hyperglycemia    8. Family history of prostate cancer    9. Snores    10. Fatigue, unspecified type    11. Daytime sleepiness      Plan:        Medication List          Accurate as of September 23, 2020  9:25 AM. If you have any questions, ask your nurse or doctor.            CHANGE how you take these medications    amLODIPine 10 MG tablet  Commonly known as: NORVASC  Take 1 tablet (10 mg total) by mouth once daily.  What changed:   · medication strength  · how much to take  Changed by: Laith Marie MD        CONTINUE taking these medications    aspirin 81 MG EC tablet  Commonly known as: ECOTRIN  Take 1 tablet (81 mg total) by mouth once daily.     atorvastatin 20 MG tablet  Commonly known as: LIPITOR  Take 1 tablet (20 mg total) by mouth once daily. At night for cholesterol     cholecalciferol (vitamin D3) 125 mcg (5,000 unit) Tab  Commonly known as: VITAMIN D3  Take 1 tablet (5,000 Units total) by mouth once daily.     fish oil-omega-3 fatty acids 300-1,000 mg capsule     GARLIC ORAL     metFORMIN 500 MG tablet  Commonly known as: GLUCOPHAGE  Take 1 tablet (500 mg total) by mouth daily with breakfast.     mometasone 0.1% 0.1 % cream  Commonly known as: ELOCON  Apply topically once daily.     sildenafiL 100 MG tablet  Commonly known as: VIAGRA  Take 1 tablet (100 mg total) by mouth daily as needed for Erectile Dysfunction.           Where to Get Your Medications      These medications were sent to [facility] [address] [place], [place] [address] [address], [place] [address]    Phone: [phone]   · amLODIPine 10 MG tablet  · sildenafiL 100 MG tablet        Wellness examination  -     CBC auto differential; Future; Expected date: 09/23/2020  -     Comprehensive metabolic panel; Future; Expected date: 09/23/2020  -     Lipid Panel; Future  -     Hemoglobin A1C; Future  -     Microalbumin/creatinine urine ratio; Future  -     TSH; Future  -     Urinalysis; Future  -     Vitamin D; Future  -     PSA, Screening; Future    Degeneration of intervertebral disc at L5-S1 level  -     CBC auto differential; Future; Expected date: 09/23/2020  -     Comprehensive metabolic panel; Future; Expected date: 09/23/2020  -     Lipid Panel; Future  -     Hemoglobin A1C; Future  -     Microalbumin/creatinine urine ratio; Future  -     TSH; Future  -     Urinalysis; Future  -     Vitamin D; Future  -     PSA, Screening; Future    Rash  -     CBC auto differential; Future; Expected date: 09/23/2020  -     Comprehensive metabolic panel; Future; Expected date: 09/23/2020  -     Lipid Panel; Future  -     Hemoglobin A1C; Future  -     Microalbumin/creatinine urine ratio; Future  -     TSH; Future  -     Urinalysis; Future  -     Vitamin D; Future  -     PSA, Screening; Future  -     Ambulatory referral/consult to Dermatology; Future; Expected date: 09/30/2020    Hyperlipidemia, unspecified hyperlipidemia type  -     CBC auto differential; Future; Expected date: 09/23/2020  -     Comprehensive metabolic panel; Future; Expected date: 09/23/2020  -     Lipid Panel; Future  -     Hemoglobin A1C; Future  -     Microalbumin/creatinine urine ratio; Future  -     TSH; Future  -     Urinalysis; Future  -     Vitamin D; Future  -     PSA, Screening; Future    Hypertension, unspecified type  -     Ambulatory referral/consult to Sleep Disorders; Future; Expected date: 09/30/2020  -     CBC auto differential; Future; Expected date: 09/23/2020  -     Comprehensive metabolic panel; Future; Expected date: 09/23/2020  -     Lipid Panel; Future  -     Hemoglobin A1C; Future  -     Microalbumin/creatinine  urine ratio; Future  -     TSH; Future  -     Urinalysis; Future  -     Vitamin D; Future  -     PSA, Screening; Future    Erectile dysfunction, unspecified erectile dysfunction type  -     CBC auto differential; Future; Expected date: 09/23/2020  -     Comprehensive metabolic panel; Future; Expected date: 09/23/2020  -     Lipid Panel; Future  -     Hemoglobin A1C; Future  -     Microalbumin/creatinine urine ratio; Future  -     TSH; Future  -     Urinalysis; Future  -     Vitamin D; Future  -     PSA, Screening; Future    Hyperglycemia  -     CBC auto differential; Future; Expected date: 09/23/2020  -     Comprehensive metabolic panel; Future; Expected date: 09/23/2020  -     Lipid Panel; Future  -     Hemoglobin A1C; Future  -     Microalbumin/creatinine urine ratio; Future  -     TSH; Future  -     Urinalysis; Future  -     Vitamin D; Future  -     PSA, Screening; Future    Family history of prostate cancer  -     CBC auto differential; Future; Expected date: 09/23/2020  -     Comprehensive metabolic panel; Future; Expected date: 09/23/2020  -     Lipid Panel; Future  -     Hemoglobin A1C; Future  -     Microalbumin/creatinine urine ratio; Future  -     TSH; Future  -     Urinalysis; Future  -     Vitamin D; Future  -     PSA, Screening; Future    Snores  -     Ambulatory referral/consult to Sleep Disorders; Future; Expected date: 09/30/2020  -     CBC auto differential; Future; Expected date: 09/23/2020  -     Comprehensive metabolic panel; Future; Expected date: 09/23/2020  -     Lipid Panel; Future  -     Hemoglobin A1C; Future  -     Microalbumin/creatinine urine ratio; Future  -     TSH; Future  -     Urinalysis; Future  -     Vitamin D; Future  -     PSA, Screening; Future    Fatigue, unspecified type  -     Ambulatory referral/consult to Sleep Disorders; Future; Expected date: 09/30/2020  -     CBC auto differential; Future; Expected date: 09/23/2020  -     Comprehensive metabolic panel; Future; Expected  date: 09/23/2020  -     Lipid Panel; Future  -     Hemoglobin A1C; Future  -     Microalbumin/creatinine urine ratio; Future  -     TSH; Future  -     Urinalysis; Future  -     Vitamin D; Future  -     PSA, Screening; Future    Daytime sleepiness  -     Ambulatory referral/consult to Sleep Disorders; Future; Expected date: 09/30/2020  -     CBC auto differential; Future; Expected date: 09/23/2020  -     Comprehensive metabolic panel; Future; Expected date: 09/23/2020  -     Lipid Panel; Future  -     Hemoglobin A1C; Future  -     Microalbumin/creatinine urine ratio; Future  -     TSH; Future  -     Urinalysis; Future  -     Vitamin D; Future  -     PSA, Screening; Future    Other orders  -     amLODIPine (NORVASC) 10 MG tablet; Take 1 tablet (10 mg total) by mouth once daily.  Dispense: 90 tablet; Refill: 3  -     sildenafiL (VIAGRA) 100 MG tablet; Take 1 tablet (100 mg total) by mouth daily as needed for Erectile Dysfunction.  Dispense: 30 tablet; Refill: 2

## 2020-09-24 RX ORDER — SILDENAFIL 100 MG/1
100 TABLET, FILM COATED ORAL DAILY PRN
Qty: 30 TABLET | Refills: 2 | Status: SHIPPED | OUTPATIENT
Start: 2020-09-24 | End: 2021-01-08 | Stop reason: SDUPTHER

## 2020-09-25 ENCOUNTER — TELEPHONE (OUTPATIENT)
Dept: FAMILY MEDICINE | Facility: CLINIC | Age: 55
End: 2020-09-25

## 2020-09-25 NOTE — TELEPHONE ENCOUNTER
----- Message from Laith Marie MD sent at 9/25/2020  7:55 AM CDT -----  Cholesterol too high. Have you been taking your atorvastatin?

## 2020-09-25 NOTE — TELEPHONE ENCOUNTER
Spoke with patient and he reports he has missed doses on the atorvastatin but reports he will start back taking the med daily.

## 2020-10-04 RX ORDER — ATORVASTATIN CALCIUM 20 MG/1
20 TABLET, FILM COATED ORAL DAILY
Qty: 90 TABLET | Refills: 3 | Status: SHIPPED | OUTPATIENT
Start: 2020-10-04 | End: 2021-09-27 | Stop reason: SDUPTHER

## 2020-10-06 ENCOUNTER — PATIENT MESSAGE (OUTPATIENT)
Dept: OTHER | Facility: OTHER | Age: 55
End: 2020-10-06

## 2020-10-08 ENCOUNTER — TELEPHONE (OUTPATIENT)
Dept: FAMILY MEDICINE | Facility: CLINIC | Age: 55
End: 2020-10-08

## 2021-01-10 RX ORDER — SILDENAFIL 100 MG/1
100 TABLET, FILM COATED ORAL DAILY PRN
Qty: 30 TABLET | Refills: 2 | Status: SHIPPED | OUTPATIENT
Start: 2021-01-10 | End: 2021-01-11 | Stop reason: SDUPTHER

## 2021-05-25 ENCOUNTER — PATIENT MESSAGE (OUTPATIENT)
Dept: ADMINISTRATIVE | Facility: OTHER | Age: 56
End: 2021-05-25

## 2021-08-04 RX ORDER — SILDENAFIL 100 MG/1
100 TABLET, FILM COATED ORAL DAILY PRN
Qty: 30 TABLET | Refills: 2 | Status: SHIPPED | OUTPATIENT
Start: 2021-08-04 | End: 2021-09-27 | Stop reason: SDUPTHER

## 2021-09-20 ENCOUNTER — TELEPHONE (OUTPATIENT)
Dept: FAMILY MEDICINE | Facility: CLINIC | Age: 56
End: 2021-09-20

## 2021-09-20 DIAGNOSIS — Z80.42 FAMILY HISTORY OF PROSTATE CANCER: ICD-10-CM

## 2021-09-20 DIAGNOSIS — Z00.00 WELLNESS EXAMINATION: ICD-10-CM

## 2021-09-20 DIAGNOSIS — R73.9 HYPERGLYCEMIA: ICD-10-CM

## 2021-09-20 DIAGNOSIS — E78.5 HYPERLIPIDEMIA, UNSPECIFIED HYPERLIPIDEMIA TYPE: Primary | ICD-10-CM

## 2021-09-20 DIAGNOSIS — I10 HYPERTENSION, UNSPECIFIED TYPE: ICD-10-CM

## 2021-09-23 ENCOUNTER — LAB VISIT (OUTPATIENT)
Dept: LAB | Facility: HOSPITAL | Age: 56
End: 2021-09-23
Attending: FAMILY MEDICINE
Payer: COMMERCIAL

## 2021-09-23 DIAGNOSIS — R73.9 HYPERGLYCEMIA: ICD-10-CM

## 2021-09-23 DIAGNOSIS — Z80.42 FAMILY HISTORY OF PROSTATE CANCER: ICD-10-CM

## 2021-09-23 DIAGNOSIS — I10 HYPERTENSION, UNSPECIFIED TYPE: ICD-10-CM

## 2021-09-23 DIAGNOSIS — E78.5 HYPERLIPIDEMIA, UNSPECIFIED HYPERLIPIDEMIA TYPE: ICD-10-CM

## 2021-09-23 DIAGNOSIS — Z00.00 WELLNESS EXAMINATION: ICD-10-CM

## 2021-09-23 LAB
25(OH)D3+25(OH)D2 SERPL-MCNC: 20 NG/ML (ref 30–96)
ALBUMIN SERPL BCP-MCNC: 4.8 G/DL (ref 3.5–5.2)
ALP SERPL-CCNC: 76 U/L (ref 38–126)
ALT SERPL W/O P-5'-P-CCNC: 24 U/L (ref 10–44)
ANION GAP SERPL CALC-SCNC: 12 MMOL/L (ref 8–16)
AST SERPL-CCNC: 34 U/L (ref 15–46)
BASOPHILS # BLD AUTO: 0.02 K/UL (ref 0–0.2)
BASOPHILS NFR BLD: 0.4 % (ref 0–1.9)
BILIRUB SERPL-MCNC: 0.9 MG/DL (ref 0.1–1)
CALCIUM SERPL-MCNC: 9.8 MG/DL (ref 8.7–10.5)
CHLORIDE SERPL-SCNC: 101 MMOL/L (ref 95–110)
CHOLEST SERPL-MCNC: 194 MG/DL (ref 120–199)
CHOLEST/HDLC SERPL: 2.7 {RATIO} (ref 2–5)
CO2 SERPL-SCNC: 29 MMOL/L (ref 23–29)
COMPLEXED PSA SERPL-MCNC: 0.97 NG/ML (ref 0–4)
CREAT SERPL-MCNC: 0.89 MG/DL (ref 0.5–1.4)
DIFFERENTIAL METHOD: ABNORMAL
EOSINOPHIL # BLD AUTO: 0.2 K/UL (ref 0–0.5)
EOSINOPHIL NFR BLD: 3.9 % (ref 0–8)
ERYTHROCYTE [DISTWIDTH] IN BLOOD BY AUTOMATED COUNT: 13.5 % (ref 11.5–14.5)
EST. GFR  (AFRICAN AMERICAN): >60 ML/MIN/1.73 M^2
EST. GFR  (NON AFRICAN AMERICAN): >60 ML/MIN/1.73 M^2
ESTIMATED AVG GLUCOSE: 134 MG/DL (ref 68–131)
GLUCOSE SERPL-MCNC: 114 MG/DL (ref 70–110)
HBA1C MFR BLD: 6.3 % (ref 4–5.6)
HCT VFR BLD AUTO: 41.6 % (ref 40–54)
HDLC SERPL-MCNC: 73 MG/DL (ref 40–75)
HDLC SERPL: 37.6 % (ref 20–50)
HGB BLD-MCNC: 13.4 G/DL (ref 14–18)
IMM GRANULOCYTES # BLD AUTO: 0.01 K/UL (ref 0–0.04)
IMM GRANULOCYTES NFR BLD AUTO: 0.2 % (ref 0–0.5)
LDLC SERPL CALC-MCNC: 108.4 MG/DL (ref 63–159)
LYMPHOCYTES # BLD AUTO: 1.7 K/UL (ref 1–4.8)
LYMPHOCYTES NFR BLD: 35 % (ref 18–48)
MCH RBC QN AUTO: 29.7 PG (ref 27–31)
MCHC RBC AUTO-ENTMCNC: 32.2 G/DL (ref 32–36)
MCV RBC AUTO: 92 FL (ref 82–98)
MONOCYTES # BLD AUTO: 0.6 K/UL (ref 0.3–1)
MONOCYTES NFR BLD: 11.5 % (ref 4–15)
NEUTROPHILS # BLD AUTO: 2.4 K/UL (ref 1.8–7.7)
NEUTROPHILS NFR BLD: 49 % (ref 38–73)
NONHDLC SERPL-MCNC: 121 MG/DL
NRBC BLD-RTO: 0 /100 WBC
PLATELET # BLD AUTO: 253 K/UL (ref 150–450)
PMV BLD AUTO: 10.7 FL (ref 9.2–12.9)
POTASSIUM SERPL-SCNC: 4.4 MMOL/L (ref 3.5–5.1)
PROT SERPL-MCNC: 8.5 G/DL (ref 6–8.4)
RBC # BLD AUTO: 4.51 M/UL (ref 4.6–6.2)
SODIUM SERPL-SCNC: 142 MMOL/L (ref 136–145)
TRIGL SERPL-MCNC: 63 MG/DL (ref 30–150)
TSH SERPL DL<=0.005 MIU/L-ACNC: 1.97 UIU/ML (ref 0.4–4)
UUN UR-MCNC: 14 MG/DL (ref 2–20)
WBC # BLD AUTO: 4.86 K/UL (ref 3.9–12.7)

## 2021-09-23 PROCEDURE — 84443 ASSAY THYROID STIM HORMONE: CPT | Mod: PO | Performed by: FAMILY MEDICINE

## 2021-09-23 PROCEDURE — 36415 COLL VENOUS BLD VENIPUNCTURE: CPT | Mod: PO | Performed by: FAMILY MEDICINE

## 2021-09-23 PROCEDURE — 83036 HEMOGLOBIN GLYCOSYLATED A1C: CPT | Performed by: FAMILY MEDICINE

## 2021-09-23 PROCEDURE — 84153 ASSAY OF PSA TOTAL: CPT | Performed by: FAMILY MEDICINE

## 2021-09-23 PROCEDURE — 80053 COMPREHEN METABOLIC PANEL: CPT | Mod: PO | Performed by: FAMILY MEDICINE

## 2021-09-23 PROCEDURE — 80061 LIPID PANEL: CPT | Performed by: FAMILY MEDICINE

## 2021-09-23 PROCEDURE — 82306 VITAMIN D 25 HYDROXY: CPT | Mod: PO | Performed by: FAMILY MEDICINE

## 2021-09-23 PROCEDURE — 85025 COMPLETE CBC W/AUTO DIFF WBC: CPT | Mod: PO | Performed by: FAMILY MEDICINE

## 2021-09-24 ENCOUNTER — TELEPHONE (OUTPATIENT)
Dept: FAMILY MEDICINE | Facility: CLINIC | Age: 56
End: 2021-09-24

## 2021-09-27 ENCOUNTER — OFFICE VISIT (OUTPATIENT)
Dept: FAMILY MEDICINE | Facility: CLINIC | Age: 56
End: 2021-09-27
Payer: COMMERCIAL

## 2021-09-27 VITALS
SYSTOLIC BLOOD PRESSURE: 132 MMHG | WEIGHT: 193 LBS | DIASTOLIC BLOOD PRESSURE: 82 MMHG | HEIGHT: 74 IN | BODY MASS INDEX: 24.77 KG/M2

## 2021-09-27 DIAGNOSIS — E78.5 HYPERLIPIDEMIA, UNSPECIFIED HYPERLIPIDEMIA TYPE: ICD-10-CM

## 2021-09-27 DIAGNOSIS — Z00.00 WELLNESS EXAMINATION: Primary | ICD-10-CM

## 2021-09-27 DIAGNOSIS — N52.9 ERECTILE DYSFUNCTION, UNSPECIFIED ERECTILE DYSFUNCTION TYPE: ICD-10-CM

## 2021-09-27 DIAGNOSIS — I10 HYPERTENSION, UNSPECIFIED TYPE: ICD-10-CM

## 2021-09-27 DIAGNOSIS — Z86.16 HISTORY OF COVID-19: ICD-10-CM

## 2021-09-27 DIAGNOSIS — E55.9 VITAMIN D DEFICIENCY: ICD-10-CM

## 2021-09-27 DIAGNOSIS — R73.9 HYPERGLYCEMIA: ICD-10-CM

## 2021-09-27 PROCEDURE — 1159F MED LIST DOCD IN RCRD: CPT | Mod: CPTII,S$GLB,, | Performed by: FAMILY MEDICINE

## 2021-09-27 PROCEDURE — 3061F NEG MICROALBUMINURIA REV: CPT | Mod: CPTII,S$GLB,, | Performed by: FAMILY MEDICINE

## 2021-09-27 PROCEDURE — 3061F PR NEG MICROALBUMINURIA RESULT DOCUMENTED/REVIEW: ICD-10-PCS | Mod: CPTII,S$GLB,, | Performed by: FAMILY MEDICINE

## 2021-09-27 PROCEDURE — 1159F PR MEDICATION LIST DOCUMENTED IN MEDICAL RECORD: ICD-10-PCS | Mod: CPTII,S$GLB,, | Performed by: FAMILY MEDICINE

## 2021-09-27 PROCEDURE — 99396 PR PREVENTIVE VISIT,EST,40-64: ICD-10-PCS | Mod: S$GLB,,, | Performed by: FAMILY MEDICINE

## 2021-09-27 PROCEDURE — 99396 PREV VISIT EST AGE 40-64: CPT | Mod: S$GLB,,, | Performed by: FAMILY MEDICINE

## 2021-09-27 PROCEDURE — 3044F HG A1C LEVEL LT 7.0%: CPT | Mod: CPTII,S$GLB,, | Performed by: FAMILY MEDICINE

## 2021-09-27 PROCEDURE — 3066F NEPHROPATHY DOC TX: CPT | Mod: CPTII,S$GLB,, | Performed by: FAMILY MEDICINE

## 2021-09-27 PROCEDURE — 3075F PR MOST RECENT SYSTOLIC BLOOD PRESS GE 130-139MM HG: ICD-10-PCS | Mod: CPTII,S$GLB,, | Performed by: FAMILY MEDICINE

## 2021-09-27 PROCEDURE — 3008F PR BODY MASS INDEX (BMI) DOCUMENTED: ICD-10-PCS | Mod: CPTII,S$GLB,, | Performed by: FAMILY MEDICINE

## 2021-09-27 PROCEDURE — 3079F PR MOST RECENT DIASTOLIC BLOOD PRESSURE 80-89 MM HG: ICD-10-PCS | Mod: CPTII,S$GLB,, | Performed by: FAMILY MEDICINE

## 2021-09-27 PROCEDURE — 3075F SYST BP GE 130 - 139MM HG: CPT | Mod: CPTII,S$GLB,, | Performed by: FAMILY MEDICINE

## 2021-09-27 PROCEDURE — 3066F PR DOCUMENTATION OF TREATMENT FOR NEPHROPATHY: ICD-10-PCS | Mod: CPTII,S$GLB,, | Performed by: FAMILY MEDICINE

## 2021-09-27 PROCEDURE — 3044F PR MOST RECENT HEMOGLOBIN A1C LEVEL <7.0%: ICD-10-PCS | Mod: CPTII,S$GLB,, | Performed by: FAMILY MEDICINE

## 2021-09-27 PROCEDURE — 3079F DIAST BP 80-89 MM HG: CPT | Mod: CPTII,S$GLB,, | Performed by: FAMILY MEDICINE

## 2021-09-27 PROCEDURE — 3008F BODY MASS INDEX DOCD: CPT | Mod: CPTII,S$GLB,, | Performed by: FAMILY MEDICINE

## 2021-09-27 RX ORDER — ATORVASTATIN CALCIUM 20 MG/1
20 TABLET, FILM COATED ORAL DAILY
Qty: 90 TABLET | Refills: 3 | Status: SHIPPED | OUTPATIENT
Start: 2021-09-27 | End: 2022-09-02 | Stop reason: SDUPTHER

## 2021-09-27 RX ORDER — METFORMIN HYDROCHLORIDE 500 MG/1
500 TABLET ORAL
Qty: 90 TABLET | Refills: 3 | Status: SHIPPED | OUTPATIENT
Start: 2021-09-27 | End: 2023-01-13

## 2021-09-27 RX ORDER — AMLODIPINE BESYLATE 10 MG/1
10 TABLET ORAL DAILY
Qty: 90 TABLET | Refills: 3 | Status: SHIPPED | OUTPATIENT
Start: 2021-09-27 | End: 2021-10-24

## 2021-09-27 RX ORDER — SILDENAFIL 100 MG/1
100 TABLET, FILM COATED ORAL DAILY PRN
Qty: 30 TABLET | Refills: 2 | Status: SHIPPED | OUTPATIENT
Start: 2021-09-27 | End: 2022-08-29 | Stop reason: SDUPTHER

## 2021-10-24 RX ORDER — AMLODIPINE BESYLATE 10 MG/1
TABLET ORAL
Qty: 90 TABLET | Refills: 3 | Status: SHIPPED | OUTPATIENT
Start: 2021-10-24 | End: 2022-11-23 | Stop reason: SDUPTHER

## 2022-08-29 NOTE — TELEPHONE ENCOUNTER
SILDENAFIL 100 MG TABLET 100 MG TABLET Supply [id number] No   Written Date Last Fill Date Quantity Days Supply   9/27/2021 7/14/2022 30 tablet 30   Sig Notes   Take 1 tablet by mouth as needed  for e.d. Not Available     Provider Information    Prescribing Provider License BRAD #   RENÉ OTTO  Not Available [id number]     Pharmacy Information    Pharmacy NCPDP ID Address Phone   [facility] [phone] [facility] [address] [phone]

## 2022-08-29 NOTE — TELEPHONE ENCOUNTER
Care Due:                  Date            Visit Type   Department     Provider  --------------------------------------------------------------------------------                                ESTABLISHED  Last Visit: 09-      PATIENT      None Found     Laith Marie  Next Visit: None Scheduled  None         None Found                                                            Last  Test          Frequency    Reason                     Performed    Due Date  --------------------------------------------------------------------------------    Office Visit  12 months..  atorvastatin, metFORMIN,   09- 09-                             sildenafiL...............    CMP.........  12 months..  atorvastatin, metFORMIN..  09- 09-    HBA1C.......  6 months...  metFORMIN................  09- 03-    Lipid Panel.  12 months..  atorvastatin.............  09- 09-    [employer] Embedded Care Gaps. Reference number: [id number]. 8/29/2022   9:11:33 AM CDT

## 2022-08-30 RX ORDER — SILDENAFIL 100 MG/1
100 TABLET, FILM COATED ORAL DAILY PRN
Qty: 30 TABLET | Refills: 2 | Status: CANCELLED | OUTPATIENT
Start: 2022-08-30 | End: 2023-08-30

## 2022-08-31 RX ORDER — SILDENAFIL 100 MG/1
100 TABLET, FILM COATED ORAL DAILY PRN
Qty: 30 TABLET | Refills: 2 | Status: SHIPPED | OUTPATIENT
Start: 2022-08-31 | End: 2023-01-13 | Stop reason: SDUPTHER

## 2022-09-01 DIAGNOSIS — E55.9 VITAMIN D DEFICIENCY: ICD-10-CM

## 2022-09-01 DIAGNOSIS — Z86.16 HISTORY OF COVID-19: ICD-10-CM

## 2022-09-01 DIAGNOSIS — I10 HYPERTENSION, UNSPECIFIED TYPE: ICD-10-CM

## 2022-09-01 DIAGNOSIS — E78.5 HYPERLIPIDEMIA, UNSPECIFIED HYPERLIPIDEMIA TYPE: ICD-10-CM

## 2022-09-01 DIAGNOSIS — R73.9 HYPERGLYCEMIA: Primary | ICD-10-CM

## 2022-09-01 DIAGNOSIS — Z00.00 WELLNESS EXAMINATION: ICD-10-CM

## 2022-09-01 DIAGNOSIS — Z80.42 FAMILY HISTORY OF PROSTATE CANCER: ICD-10-CM

## 2022-09-01 NOTE — TELEPHONE ENCOUNTER
----- Message from Shantelle Watkins sent at 9/1/2022  9:13 AM CDT -----  Contact: [phone]  Who Called: PT    Regarding: pt requesting orders for annual labs     Would the patient rather a call back or a response via [facility]? Call back    Best Call Back Number: [phone]    Additional Information: reason for refill denial.

## 2022-09-02 NOTE — TELEPHONE ENCOUNTER
No new care gaps identified.  [facility] Embedded Care Gaps. Reference number: [id number]. 9/02/2022   8:13:57 AM TRACIT

## 2022-09-03 RX ORDER — ATORVASTATIN CALCIUM 20 MG/1
20 TABLET, FILM COATED ORAL DAILY
Qty: 90 TABLET | Refills: 3 | Status: SHIPPED | OUTPATIENT
Start: 2022-09-03 | End: 2023-09-11 | Stop reason: SDUPTHER

## 2022-09-08 ENCOUNTER — LAB VISIT (OUTPATIENT)
Dept: LAB | Facility: HOSPITAL | Age: 57
End: 2022-09-08
Attending: FAMILY MEDICINE
Payer: COMMERCIAL

## 2022-09-08 DIAGNOSIS — E55.9 VITAMIN D DEFICIENCY: ICD-10-CM

## 2022-09-08 DIAGNOSIS — Z00.00 WELLNESS EXAMINATION: ICD-10-CM

## 2022-09-08 DIAGNOSIS — E78.5 HYPERLIPIDEMIA, UNSPECIFIED HYPERLIPIDEMIA TYPE: ICD-10-CM

## 2022-09-08 DIAGNOSIS — R73.9 HYPERGLYCEMIA: ICD-10-CM

## 2022-09-08 DIAGNOSIS — I10 HYPERTENSION, UNSPECIFIED TYPE: ICD-10-CM

## 2022-09-08 DIAGNOSIS — Z80.42 FAMILY HISTORY OF PROSTATE CANCER: ICD-10-CM

## 2022-09-08 DIAGNOSIS — Z86.16 HISTORY OF COVID-19: ICD-10-CM

## 2022-09-08 LAB
ALBUMIN SERPL BCP-MCNC: 4.8 G/DL (ref 3.5–5.2)
ALP SERPL-CCNC: 70 U/L (ref 38–126)
ALT SERPL W/O P-5'-P-CCNC: 29 U/L (ref 10–44)
ANION GAP SERPL CALC-SCNC: 7 MMOL/L (ref 8–16)
AST SERPL-CCNC: 31 U/L (ref 15–46)
BASOPHILS # BLD AUTO: 0.03 K/UL (ref 0–0.2)
BASOPHILS NFR BLD: 0.5 % (ref 0–1.9)
BILIRUB SERPL-MCNC: 0.8 MG/DL (ref 0.1–1)
CALCIUM SERPL-MCNC: 9.7 MG/DL (ref 8.7–10.5)
CHLORIDE SERPL-SCNC: 102 MMOL/L (ref 95–110)
CHOLEST SERPL-MCNC: 217 MG/DL (ref 120–199)
CHOLEST/HDLC SERPL: 2.9 {RATIO} (ref 2–5)
CO2 SERPL-SCNC: 32 MMOL/L (ref 23–29)
COMPLEXED PSA SERPL-MCNC: 1.1 NG/ML (ref 0–4)
CREAT SERPL-MCNC: 0.97 MG/DL (ref 0.5–1.4)
DIFFERENTIAL METHOD: ABNORMAL
EOSINOPHIL # BLD AUTO: 0.2 K/UL (ref 0–0.5)
EOSINOPHIL NFR BLD: 3.3 % (ref 0–8)
ERYTHROCYTE [DISTWIDTH] IN BLOOD BY AUTOMATED COUNT: 13.6 % (ref 11.5–14.5)
EST. GFR  (NO RACE VARIABLE): >60 ML/MIN/1.73 M^2
ESTIMATED AVG GLUCOSE: 134 MG/DL (ref 68–131)
GLUCOSE SERPL-MCNC: 111 MG/DL (ref 70–110)
HBA1C MFR BLD: 6.3 % (ref 4–5.6)
HCT VFR BLD AUTO: 42.2 % (ref 40–54)
HDLC SERPL-MCNC: 74 MG/DL (ref 40–75)
HDLC SERPL: 34.1 % (ref 20–50)
HGB BLD-MCNC: 13.4 G/DL (ref 14–18)
IMM GRANULOCYTES # BLD AUTO: 0.02 K/UL (ref 0–0.04)
IMM GRANULOCYTES NFR BLD AUTO: 0.3 % (ref 0–0.5)
LDLC SERPL CALC-MCNC: 126.6 MG/DL (ref 63–159)
LYMPHOCYTES # BLD AUTO: 1.9 K/UL (ref 1–4.8)
LYMPHOCYTES NFR BLD: 31.3 % (ref 18–48)
MCH RBC QN AUTO: 28.9 PG (ref 27–31)
MCHC RBC AUTO-ENTMCNC: 31.8 G/DL (ref 32–36)
MCV RBC AUTO: 91 FL (ref 82–98)
MONOCYTES # BLD AUTO: 0.5 K/UL (ref 0.3–1)
MONOCYTES NFR BLD: 8.9 % (ref 4–15)
NEUTROPHILS # BLD AUTO: 3.4 K/UL (ref 1.8–7.7)
NEUTROPHILS NFR BLD: 55.7 % (ref 38–73)
NONHDLC SERPL-MCNC: 143 MG/DL
NRBC BLD-RTO: 0 /100 WBC
PLATELET # BLD AUTO: 251 K/UL (ref 150–450)
PMV BLD AUTO: 10.7 FL (ref 9.2–12.9)
POTASSIUM SERPL-SCNC: 4.5 MMOL/L (ref 3.5–5.1)
PROT SERPL-MCNC: 8.3 G/DL (ref 6–8.4)
RBC # BLD AUTO: 4.63 M/UL (ref 4.6–6.2)
SODIUM SERPL-SCNC: 141 MMOL/L (ref 136–145)
TRIGL SERPL-MCNC: 82 MG/DL (ref 30–150)
TSH SERPL DL<=0.005 MIU/L-ACNC: 1.75 UIU/ML (ref 0.4–4)
UUN UR-MCNC: 18 MG/DL (ref 2–20)
WBC # BLD AUTO: 6.08 K/UL (ref 3.9–12.7)

## 2022-09-08 PROCEDURE — 85025 COMPLETE CBC W/AUTO DIFF WBC: CPT | Mod: PO | Performed by: FAMILY MEDICINE

## 2022-09-08 PROCEDURE — 84443 ASSAY THYROID STIM HORMONE: CPT | Mod: PO | Performed by: FAMILY MEDICINE

## 2022-09-08 PROCEDURE — 80061 LIPID PANEL: CPT | Performed by: FAMILY MEDICINE

## 2022-09-08 PROCEDURE — 36415 COLL VENOUS BLD VENIPUNCTURE: CPT | Mod: PO | Performed by: FAMILY MEDICINE

## 2022-09-08 PROCEDURE — 83036 HEMOGLOBIN GLYCOSYLATED A1C: CPT | Performed by: FAMILY MEDICINE

## 2022-09-08 PROCEDURE — 80053 COMPREHEN METABOLIC PANEL: CPT | Mod: PO | Performed by: FAMILY MEDICINE

## 2022-09-08 PROCEDURE — 84153 ASSAY OF PSA TOTAL: CPT | Performed by: FAMILY MEDICINE

## 2022-11-23 RX ORDER — AMLODIPINE BESYLATE 10 MG/1
10 TABLET ORAL DAILY
Qty: 90 TABLET | Refills: 3 | Status: SHIPPED | OUTPATIENT
Start: 2022-11-23 | End: 2023-09-11 | Stop reason: SDUPTHER

## 2022-11-23 RX ORDER — MOMETASONE FUROATE 1 MG/G
CREAM TOPICAL DAILY
Qty: 45 EACH | Refills: 5 | Status: SHIPPED | OUTPATIENT
Start: 2022-11-23

## 2022-11-23 NOTE — TELEPHONE ENCOUNTER
No new care gaps identified.  [facility] Embedded Care Gaps. Reference number: [id number]. 11/23/2022   8:26:36 AM CST

## 2022-12-30 ENCOUNTER — PATIENT MESSAGE (OUTPATIENT)
Dept: ADMINISTRATIVE | Facility: HOSPITAL | Age: 57
End: 2022-12-30
Payer: COMMERCIAL

## 2022-12-30 ENCOUNTER — PATIENT OUTREACH (OUTPATIENT)
Dept: ADMINISTRATIVE | Facility: HOSPITAL | Age: 57
End: 2022-12-30
Payer: COMMERCIAL

## 2022-12-30 NOTE — PROGRESS NOTES
Care Everywhere updates requested and reviewed.  Immunizations reconciled. Media reports reviewed.  Duplicate HM overrides and  orders removed.  Overdue HM topic chart audit and/or requested.  Overdue lab testing linked to upcoming lab appointments if applies.    Health Maintenance Due   Topic Date Due    HIV Screening  Never done    Colorectal Cancer Screening  Never done    Shingles Vaccine (1 of 2) Never done    COVID-19 Vaccine (5 - Booster for Pfizer series) 2022

## 2023-01-12 ENCOUNTER — PATIENT OUTREACH (OUTPATIENT)
Dept: ADMINISTRATIVE | Facility: OTHER | Age: 58
End: 2023-01-12
Payer: COMMERCIAL

## 2023-01-12 NOTE — PROGRESS NOTES
CHW - Initial Contact    This Community Health Worker completed the Social Determinant of Health questionnaire with MRN [id number] over the phone today.    Pt identified barriers of most importance are: No barriers reported   Referrals to community agencies completed with patient/caregiver consent outside of [facility] include: No  Referrals were put through [facility] - No  Support and Services: No support & services have been documented.  Other information discussed the patient needs / wants help with: No assistance requested   Follow up required: No  No future outreach task assigned

## 2023-01-13 ENCOUNTER — OFFICE VISIT (OUTPATIENT)
Dept: FAMILY MEDICINE | Facility: CLINIC | Age: 58
End: 2023-01-13
Payer: COMMERCIAL

## 2023-01-13 ENCOUNTER — TELEPHONE (OUTPATIENT)
Dept: ADMINISTRATIVE | Facility: OTHER | Age: 58
End: 2023-01-13
Payer: COMMERCIAL

## 2023-01-13 VITALS
TEMPERATURE: 98 F | DIASTOLIC BLOOD PRESSURE: 70 MMHG | SYSTOLIC BLOOD PRESSURE: 132 MMHG | HEART RATE: 63 BPM | BODY MASS INDEX: 26.01 KG/M2 | OXYGEN SATURATION: 96 % | WEIGHT: 202.69 LBS | HEIGHT: 74 IN

## 2023-01-13 DIAGNOSIS — K21.9 GASTROESOPHAGEAL REFLUX DISEASE, UNSPECIFIED WHETHER ESOPHAGITIS PRESENT: ICD-10-CM

## 2023-01-13 DIAGNOSIS — E78.5 HYPERLIPIDEMIA, UNSPECIFIED HYPERLIPIDEMIA TYPE: ICD-10-CM

## 2023-01-13 DIAGNOSIS — R73.9 HYPERGLYCEMIA: ICD-10-CM

## 2023-01-13 DIAGNOSIS — Z12.11 SCREEN FOR COLON CANCER: ICD-10-CM

## 2023-01-13 DIAGNOSIS — I10 PRIMARY HYPERTENSION: ICD-10-CM

## 2023-01-13 DIAGNOSIS — M51.37 DEGENERATION OF INTERVERTEBRAL DISC AT L5-S1 LEVEL: ICD-10-CM

## 2023-01-13 DIAGNOSIS — Z80.42 FAMILY HISTORY OF PROSTATE CANCER: ICD-10-CM

## 2023-01-13 DIAGNOSIS — R13.19 ESOPHAGEAL DYSPHAGIA: ICD-10-CM

## 2023-01-13 DIAGNOSIS — Z00.00 WELLNESS EXAMINATION: Primary | ICD-10-CM

## 2023-01-13 PROCEDURE — 99396 PREV VISIT EST AGE 40-64: CPT | Mod: S$GLB,,, | Performed by: FAMILY MEDICINE

## 2023-01-13 PROCEDURE — 3075F SYST BP GE 130 - 139MM HG: CPT | Mod: CPTII,S$GLB,, | Performed by: FAMILY MEDICINE

## 2023-01-13 PROCEDURE — 3008F PR BODY MASS INDEX (BMI) DOCUMENTED: ICD-10-PCS | Mod: CPTII,S$GLB,, | Performed by: FAMILY MEDICINE

## 2023-01-13 PROCEDURE — 3075F PR MOST RECENT SYSTOLIC BLOOD PRESS GE 130-139MM HG: ICD-10-PCS | Mod: CPTII,S$GLB,, | Performed by: FAMILY MEDICINE

## 2023-01-13 PROCEDURE — 1160F RVW MEDS BY RX/DR IN RCRD: CPT | Mod: CPTII,S$GLB,, | Performed by: FAMILY MEDICINE

## 2023-01-13 PROCEDURE — 1159F MED LIST DOCD IN RCRD: CPT | Mod: CPTII,S$GLB,, | Performed by: FAMILY MEDICINE

## 2023-01-13 PROCEDURE — 3078F PR MOST RECENT DIASTOLIC BLOOD PRESSURE < 80 MM HG: ICD-10-PCS | Mod: CPTII,S$GLB,, | Performed by: FAMILY MEDICINE

## 2023-01-13 PROCEDURE — 3008F BODY MASS INDEX DOCD: CPT | Mod: CPTII,S$GLB,, | Performed by: FAMILY MEDICINE

## 2023-01-13 PROCEDURE — 99396 PR PREVENTIVE VISIT,EST,40-64: ICD-10-PCS | Mod: S$GLB,,, | Performed by: FAMILY MEDICINE

## 2023-01-13 PROCEDURE — 1159F PR MEDICATION LIST DOCUMENTED IN MEDICAL RECORD: ICD-10-PCS | Mod: CPTII,S$GLB,, | Performed by: FAMILY MEDICINE

## 2023-01-13 PROCEDURE — 1160F PR REVIEW ALL MEDS BY PRESCRIBER/CLIN PHARMACIST DOCUMENTED: ICD-10-PCS | Mod: CPTII,S$GLB,, | Performed by: FAMILY MEDICINE

## 2023-01-13 PROCEDURE — 3078F DIAST BP <80 MM HG: CPT | Mod: CPTII,S$GLB,, | Performed by: FAMILY MEDICINE

## 2023-01-13 RX ORDER — SILDENAFIL 100 MG/1
100 TABLET, FILM COATED ORAL DAILY PRN
Qty: 30 TABLET | Refills: 2 | Status: SHIPPED | OUTPATIENT
Start: 2023-01-13 | End: 2023-10-02 | Stop reason: SDUPTHER

## 2023-01-13 NOTE — LETTER
January 13, 2023      [address] [address]  Phone: [phone]  Fax: [phone]       Patient: Kike Swartz   YOB: 1965  Date of Visit: 01/13/2023    To Whom It May Concern:    Lupillo Swartz  was at [facility] on 01/13/2023.  Please excuse his absence. If you have any questions or concerns, or if I can be of further assistance, please do not hesitate to contact me.    Sincerely,      Laith Marie MD

## 2023-01-13 NOTE — PROGRESS NOTES
"Subjective:      Patient ID: Kike Swartz is a 57 y.o. male.    Chief Complaint: Annual Exam      Vitals:    01/13/23 1128   BP: 132/70   Pulse: 63   Temp: 98 °F (36.7 °C)   TempSrc: Oral   SpO2: 96%   Weight: 92 kg (202 lb 11.4 oz)   Height: 6' 2" (1.88 m)        HPI   Wellness;suffers with hearburn after eating in PM and laying down and feels food hang s up, so GI referrrla for both uppper/lower endodocopy      Problem List  Patient Active Problem List   Diagnosis    Hypertension    Hyperglycemia    Erectile dysfunction    Family history of prostate cancer    Hyperlipidemia    Rash    Degeneration of intervertebral disc at L5-S1 level        ALLERGIES:   Review of patient's allergies indicates:   Allergen Reactions    Ace inhibitors      angioedema       MEDS:   Current Outpatient Medications:     amLODIPine (NORVASC) 10 MG tablet, Take 1 tablet (10 mg total) by mouth once daily., Disp: 90 tablet, Rfl: 3    atorvastatin (LIPITOR) 20 MG tablet, Take 1 tablet (20 mg total) by mouth once daily. At night for cholesterol, Disp: 90 tablet, Rfl: 3    cholecalciferol, vitamin D3, (VITAMIN D3) 5,000 unit Tab, Take 1 tablet (5,000 Units total) by mouth once daily., Disp: 90 tablet, Rfl: 3    mometasone 0.1% (ELOCON) 0.1 % cream, Apply topically once daily., Disp: 45 Tube, Rfl: 5    mometasone 0.1% (ELOCON) 0.1 % cream, Apply topically once daily., Disp: 45 each, Rfl: 5    omega-3 fatty acids/fish oil (FISH OIL-OMEGA-3 FATTY ACIDS) 300-1,000 mg capsule, Take by mouth once daily., Disp: , Rfl:     sildenafiL (VIAGRA) 100 MG tablet, Take 1 tablet (100 mg total) by mouth daily as needed for Erectile Dysfunction., Disp: 30 tablet, Rfl: 2    aspirin (ECOTRIN) 81 MG EC tablet, Take 1 tablet (81 mg total) by mouth once daily. (Patient not taking: Reported on 1/13/2023), Disp: 100 tablet, Rfl: 12    GARLIC ORAL, Take by mouth., Disp: , Rfl:     metFORMIN (GLUCOPHAGE) 500 MG tablet, Take 1 tablet (500 mg total) by mouth daily " with breakfast. For DM (Patient not taking: Reported on 2023), Disp: 90 tablet, Rfl: 3      History:  Current Providers as of 2023  PCP: Laith Marie MD  Care Team Provider: Ned Olivera LPN  Care Team Provider: Tosha Knott LPN  Encounter Provider: Laith Marie MD, starting on  12:00 AM  Referring Provider: not found, starting on  12:00 AM  Consulting Physician: Laith Marie MD, starting on   1:43 PM (Active)   Past Medical History:   Diagnosis Date    Hypertension      Past Surgical History:   Procedure Laterality Date    COLONOSCOPY      Dr Marin      Social History     Tobacco Use    Smoking status: Former     Packs/day: 1.00     Years: 20.00     Pack years: 20.00     Types: Cigarettes     Quit date: 2000     Years since quittin.0    Smokeless tobacco: Former   Substance Use Topics    Alcohol use: Yes     Comment: social    Drug use: No         Review of Systems  Objective:     Physical Exam        Assessment:     1. Screen for colon cancer      Plan:        Medication List            Accurate as of 2023 12:07 PM. If you have any questions, ask your nurse or doctor.                CONTINUE taking these medications      amLODIPine 10 MG tablet  Commonly known as: NORVASC  Take 1 tablet (10 mg total) by mouth once daily.     aspirin 81 MG EC tablet  Commonly known as: ECOTRIN  Take 1 tablet (81 mg total) by mouth once daily.     atorvastatin 20 MG tablet  Commonly known as: LIPITOR  Take 1 tablet (20 mg total) by mouth once daily. At night for cholesterol     cholecalciferol (vitamin D3) 125 mcg (5,000 unit) Tab  Commonly known as: VITAMIN D3  Take 1 tablet (5,000 Units total) by mouth once daily.     fish oil-omega-3 fatty acids 300-1,000 mg capsule     GARLIC ORAL     metFORMIN 500 MG tablet  Commonly known as: GLUCOPHAGE  Take 1 tablet (500 mg total) by mouth daily with breakfast. For DM     * mometasone 0.1% 0.1  % cream  Commonly known as: ELOCON  Apply topically once daily.     * mometasone 0.1% 0.1 % cream  Commonly known as: ELOCON  Apply topically once daily.     sildenafiL 100 MG tablet  Commonly known as: VIAGRA  Take 1 tablet (100 mg total) by mouth daily as needed for Erectile Dysfunction.           * This list has 2 medication(s) that are the same as other medications prescribed for you. Read the directions carefully, and ask your doctor or other care provider to review them with you.                Screen for colon cancer  -     Case Request Endoscopy: COLONOSCOPY

## 2023-04-11 ENCOUNTER — PATIENT MESSAGE (OUTPATIENT)
Dept: ADMINISTRATIVE | Facility: HOSPITAL | Age: 58
End: 2023-04-11
Payer: COMMERCIAL

## 2023-09-09 NOTE — TELEPHONE ENCOUNTER
No care due was identified.  [facility] Embedded Care Due Messages. Reference number: [id number].   9/08/2023 10:37:22 PM CDT

## 2023-09-09 NOTE — TELEPHONE ENCOUNTER
Care Due:                  Date            Visit Type   Department     Provider  --------------------------------------------------------------------------------                                EP -                              PRIMARY      [facility]  Last Visit: 01-      CARE ([facility])   JAQUAN Marie                               -                              PRIMARY      [facility]  Next Visit: 01-      CARE ([facility])   JAQUAN Marie                                                            Last  Test          Frequency    Reason                     Performed    Due Date  --------------------------------------------------------------------------------    CMP.........  12 months..  atorvastatin.............  09- 09-    Lipid Panel.  12 months..  atorvastatin.............  09- 09-    [facility] Embedded Care Due Messages. Reference number: [id number].   9/08/2023 10:33:06 PM CDT

## 2023-09-10 RX ORDER — ATORVASTATIN CALCIUM 20 MG/1
20 TABLET, FILM COATED ORAL DAILY
Qty: 90 TABLET | Refills: 0 | OUTPATIENT
Start: 2023-09-10 | End: 2024-09-09

## 2023-09-10 RX ORDER — AMLODIPINE BESYLATE 10 MG/1
10 TABLET ORAL DAILY
Qty: 90 TABLET | Refills: 0 | OUTPATIENT
Start: 2023-09-10

## 2023-09-11 RX ORDER — AMLODIPINE BESYLATE 10 MG/1
10 TABLET ORAL DAILY
Qty: 90 TABLET | Refills: 1 | Status: SHIPPED | OUTPATIENT
Start: 2023-09-11 | End: 2024-03-04 | Stop reason: SDUPTHER

## 2023-09-11 RX ORDER — ATORVASTATIN CALCIUM 20 MG/1
TABLET, FILM COATED ORAL
Qty: 30 TABLET | Refills: 0 | OUTPATIENT
Start: 2023-09-11

## 2023-09-11 RX ORDER — ATORVASTATIN CALCIUM 20 MG/1
20 TABLET, FILM COATED ORAL DAILY
Qty: 90 TABLET | Refills: 3 | Status: SHIPPED | OUTPATIENT
Start: 2023-09-11 | End: 2024-09-10

## 2023-09-11 NOTE — TELEPHONE ENCOUNTER
Refill Routing Note   Medication(s) are not appropriate for processing by [facility] for the following reason(s):      Required labs outdated    [facility] action(s):  Route None identified        Medication reconciliation completed: No     Appointments  past 12m or future 3m with PCP    Date Provider   Last Visit   1/13/2023 Laith Marie MD   Next Visit   1/17/2024 Laith Marie MD   ED visits in past 90 days: 0        Note composed:9:21 AM 09/11/2023

## 2023-09-11 NOTE — TELEPHONE ENCOUNTER
----- Message from Tamica Malave sent at 9/11/2023  8:10 AM CDT -----  Contact: pt  Type: Call Back     Who Called:pt    Does the patient know what this is regarding?:prescriptions   Would the patient rather a call back or a response via [facility]? Call   Best Call Back Number:[phone]  Additional Information:

## 2023-09-11 NOTE — TELEPHONE ENCOUNTER
Provider Staff:     Action is required for this patient.   Please see care gap opportunities below in Care Due Message.     Thanks!  [facility]     Appointments      Date Provider   Last Visit   1/13/2023 Laith Marie MD   Next Visit   9/8/2023 Laith Marie MD     Note composed:9:23 AM 09/11/2023

## 2023-10-02 NOTE — TELEPHONE ENCOUNTER
No care due was identified.  [facility] Embedded Care Due Messages. Reference number: [id number].   10/02/2023 1:48:39 PM CDT

## 2023-10-02 NOTE — TELEPHONE ENCOUNTER
Order Details    Medication NDC Prescription # TERRI   SILDENAFIL 100 MG TABLET [id number] No   Written Date Last Fill Date Quantity Days Supply   1/13/2023 8/9/2023 30 tablet 30   Sig Notes   TAKE 1 TABLET BY MOUTH DAILY AS NEEDED FOR E.D. Not Available     Provider Information    Prescribing Provider License BRAD #   RENÉ OTTO  Not Available [id number]     Pharmacy Information    Pharmacy NCPDP ID Address Phone   [facility] [employer] [phone] [place] [address]

## 2023-10-03 RX ORDER — SILDENAFIL 100 MG/1
100 TABLET, FILM COATED ORAL DAILY PRN
Qty: 30 TABLET | Refills: 2 | Status: SHIPPED | OUTPATIENT
Start: 2023-10-03 | End: 2023-10-03 | Stop reason: SDUPTHER

## 2023-10-03 NOTE — TELEPHONE ENCOUNTER
No care due was identified.  [facility] Embedded Care Due Messages. Reference number: [id number].   10/03/2023 9:18:27 AM CDT

## 2023-10-04 RX ORDER — SILDENAFIL 100 MG/1
100 TABLET, FILM COATED ORAL DAILY PRN
Qty: 30 TABLET | Refills: 2 | Status: SHIPPED | OUTPATIENT
Start: 2023-10-04 | End: 2024-10-03

## 2023-11-01 ENCOUNTER — PATIENT MESSAGE (OUTPATIENT)
Dept: ADMINISTRATIVE | Facility: HOSPITAL | Age: 58
End: 2023-11-01
Payer: COMMERCIAL

## 2023-11-01 ENCOUNTER — PATIENT OUTREACH (OUTPATIENT)
Dept: ADMINISTRATIVE | Facility: HOSPITAL | Age: 58
End: 2023-11-01
Payer: COMMERCIAL

## 2023-11-01 NOTE — PROGRESS NOTES
Care Everywhere updates requested and reviewed.  Immunizations reconciled. Media reports reviewed.  Duplicate HM overrides and  orders removed.  Overdue HM topic chart audit and/or requested.  Overdue lab testing linked to upcoming lab appointments if applies.    Health Maintenance Due   Topic Date Due    HIV Screening  Never done    Colorectal Cancer Screening  Never done    Shingles Vaccine (1 of 2) Never done    Influenza Vaccine (1) 2023    RSV Vaccine (Age 60+) (1 - 1-dose 60+ series) Never done

## 2023-11-14 ENCOUNTER — LAB VISIT (OUTPATIENT)
Dept: LAB | Facility: HOSPITAL | Age: 58
End: 2023-11-14
Attending: FAMILY MEDICINE
Payer: COMMERCIAL

## 2023-11-14 DIAGNOSIS — E78.5 HYPERLIPIDEMIA, UNSPECIFIED HYPERLIPIDEMIA TYPE: ICD-10-CM

## 2023-11-14 DIAGNOSIS — R73.9 HYPERGLYCEMIA: ICD-10-CM

## 2023-11-14 LAB
CHOLEST SERPL-MCNC: 190 MG/DL (ref 120–199)
CHOLEST/HDLC SERPL: 3.1 {RATIO} (ref 2–5)
HDLC SERPL-MCNC: 61 MG/DL (ref 40–75)
HDLC SERPL: 32.1 % (ref 20–50)
LDLC SERPL CALC-MCNC: 110.4 MG/DL (ref 63–159)
NONHDLC SERPL-MCNC: 129 MG/DL
TRIGL SERPL-MCNC: 93 MG/DL (ref 30–150)

## 2023-11-14 PROCEDURE — 80061 LIPID PANEL: CPT | Performed by: FAMILY MEDICINE

## 2023-11-14 PROCEDURE — 36415 COLL VENOUS BLD VENIPUNCTURE: CPT | Mod: PO | Performed by: FAMILY MEDICINE

## 2023-11-15 ENCOUNTER — OFFICE VISIT (OUTPATIENT)
Dept: FAMILY MEDICINE | Facility: CLINIC | Age: 58
End: 2023-11-15
Payer: COMMERCIAL

## 2023-11-15 VITALS
HEART RATE: 85 BPM | HEIGHT: 74 IN | DIASTOLIC BLOOD PRESSURE: 60 MMHG | BODY MASS INDEX: 25.64 KG/M2 | SYSTOLIC BLOOD PRESSURE: 122 MMHG | OXYGEN SATURATION: 96 % | WEIGHT: 199.75 LBS

## 2023-11-15 DIAGNOSIS — Z12.5 PROSTATE CANCER SCREENING: ICD-10-CM

## 2023-11-15 DIAGNOSIS — R73.9 HYPERGLYCEMIA: ICD-10-CM

## 2023-11-15 DIAGNOSIS — R13.10 DYSPHAGIA, UNSPECIFIED TYPE: Primary | ICD-10-CM

## 2023-11-15 DIAGNOSIS — E78.2 MIXED HYPERLIPIDEMIA: ICD-10-CM

## 2023-11-15 DIAGNOSIS — R10.9 ABDOMINAL PAIN, UNSPECIFIED ABDOMINAL LOCATION: ICD-10-CM

## 2023-11-15 DIAGNOSIS — I10 PRIMARY HYPERTENSION: ICD-10-CM

## 2023-11-15 PROCEDURE — 1160F RVW MEDS BY RX/DR IN RCRD: CPT | Mod: CPTII,S$GLB,, | Performed by: STUDENT IN AN ORGANIZED HEALTH CARE EDUCATION/TRAINING PROGRAM

## 2023-11-15 PROCEDURE — 3044F HG A1C LEVEL LT 7.0%: CPT | Mod: CPTII,S$GLB,, | Performed by: STUDENT IN AN ORGANIZED HEALTH CARE EDUCATION/TRAINING PROGRAM

## 2023-11-15 PROCEDURE — 1160F PR REVIEW ALL MEDS BY PRESCRIBER/CLIN PHARMACIST DOCUMENTED: ICD-10-PCS | Mod: CPTII,S$GLB,, | Performed by: STUDENT IN AN ORGANIZED HEALTH CARE EDUCATION/TRAINING PROGRAM

## 2023-11-15 PROCEDURE — 3008F PR BODY MASS INDEX (BMI) DOCUMENTED: ICD-10-PCS | Mod: CPTII,S$GLB,, | Performed by: STUDENT IN AN ORGANIZED HEALTH CARE EDUCATION/TRAINING PROGRAM

## 2023-11-15 PROCEDURE — 99214 PR OFFICE/OUTPT VISIT, EST, LEVL IV, 30-39 MIN: ICD-10-PCS | Mod: S$GLB,,, | Performed by: STUDENT IN AN ORGANIZED HEALTH CARE EDUCATION/TRAINING PROGRAM

## 2023-11-15 PROCEDURE — 3008F BODY MASS INDEX DOCD: CPT | Mod: CPTII,S$GLB,, | Performed by: STUDENT IN AN ORGANIZED HEALTH CARE EDUCATION/TRAINING PROGRAM

## 2023-11-15 PROCEDURE — 3078F PR MOST RECENT DIASTOLIC BLOOD PRESSURE < 80 MM HG: ICD-10-PCS | Mod: CPTII,S$GLB,, | Performed by: STUDENT IN AN ORGANIZED HEALTH CARE EDUCATION/TRAINING PROGRAM

## 2023-11-15 PROCEDURE — 3074F PR MOST RECENT SYSTOLIC BLOOD PRESSURE < 130 MM HG: ICD-10-PCS | Mod: CPTII,S$GLB,, | Performed by: STUDENT IN AN ORGANIZED HEALTH CARE EDUCATION/TRAINING PROGRAM

## 2023-11-15 PROCEDURE — 1159F PR MEDICATION LIST DOCUMENTED IN MEDICAL RECORD: ICD-10-PCS | Mod: CPTII,S$GLB,, | Performed by: STUDENT IN AN ORGANIZED HEALTH CARE EDUCATION/TRAINING PROGRAM

## 2023-11-15 PROCEDURE — 3078F DIAST BP <80 MM HG: CPT | Mod: CPTII,S$GLB,, | Performed by: STUDENT IN AN ORGANIZED HEALTH CARE EDUCATION/TRAINING PROGRAM

## 2023-11-15 PROCEDURE — 1159F MED LIST DOCD IN RCRD: CPT | Mod: CPTII,S$GLB,, | Performed by: STUDENT IN AN ORGANIZED HEALTH CARE EDUCATION/TRAINING PROGRAM

## 2023-11-15 PROCEDURE — 3044F PR MOST RECENT HEMOGLOBIN A1C LEVEL <7.0%: ICD-10-PCS | Mod: CPTII,S$GLB,, | Performed by: STUDENT IN AN ORGANIZED HEALTH CARE EDUCATION/TRAINING PROGRAM

## 2023-11-15 PROCEDURE — 99214 OFFICE O/P EST MOD 30 MIN: CPT | Mod: S$GLB,,, | Performed by: STUDENT IN AN ORGANIZED HEALTH CARE EDUCATION/TRAINING PROGRAM

## 2023-11-15 PROCEDURE — 3074F SYST BP LT 130 MM HG: CPT | Mod: CPTII,S$GLB,, | Performed by: STUDENT IN AN ORGANIZED HEALTH CARE EDUCATION/TRAINING PROGRAM

## 2023-11-20 ENCOUNTER — HOSPITAL ENCOUNTER (OUTPATIENT)
Dept: RADIOLOGY | Facility: HOSPITAL | Age: 58
Discharge: HOME | End: 2023-11-20
Attending: STUDENT IN AN ORGANIZED HEALTH CARE EDUCATION/TRAINING PROGRAM
Payer: COMMERCIAL

## 2023-11-20 DIAGNOSIS — R10.9 ABDOMINAL PAIN, UNSPECIFIED ABDOMINAL LOCATION: ICD-10-CM

## 2023-11-20 PROCEDURE — 76700 US EXAM ABDOM COMPLETE: CPT | Mod: 26,,, | Performed by: RADIOLOGY

## 2023-11-20 PROCEDURE — 76700 US ABDOMEN COMPLETE: ICD-10-PCS | Mod: 26,,, | Performed by: RADIOLOGY

## 2023-11-20 PROCEDURE — 76700 US EXAM ABDOM COMPLETE: CPT | Mod: TC,PN

## 2023-12-03 NOTE — PROGRESS NOTES
Patient ID: Kike Swartz is a 58 y.o. male.     Chief Complaint: abdominal pain    Abdominal Pain  This is a new problem. The current episode started more than 1 month ago. The onset quality is gradual. The problem occurs intermittently. The problem has been waxing and waning. The pain is located in the generalized abdominal region. The pain is mild. The quality of the pain is colicky and cramping. The abdominal pain does not radiate. Pertinent negatives include no arthralgias, belching, diarrhea, fever, frequency, headaches, hematochezia, myalgias, nausea or vomiting. Associated symptoms comments: Problems swallowing. Nothing aggravates the pain. The pain is relieved by Nothing.          Review of Systems  Review of Systems   Constitutional:  Negative for fever.   HENT:  Negative for ear pain and sinus pain.    Eyes:  Negative for discharge.   Respiratory:  Negative for cough and shortness of breath.    Cardiovascular:  Negative for chest pain and leg swelling.   Gastrointestinal:  Positive for abdominal pain. Negative for diarrhea, hematochezia, nausea and vomiting.   Genitourinary:  Negative for frequency and urgency.   Musculoskeletal:  Negative for arthralgias and myalgias.   Skin:  Negative for rash.   Neurological:  Negative for weakness and headaches.   Psychiatric/Behavioral:  Negative for depression.    All other systems reviewed and are negative.      Currently Medications  Current Outpatient Medications on File Prior to Visit   Medication Sig Dispense Refill    amLODIPine (NORVASC) 10 MG tablet Take 1 tablet (10 mg total) by mouth once daily. 90 tablet 1    atorvastatin (LIPITOR) 20 MG tablet Take 1 tablet (20 mg total) by mouth once daily. At night for cholesterol 90 tablet 3    cholecalciferol, vitamin D3, (VITAMIN D3) 5,000 unit Tab Take 1 tablet (5,000 Units total) by mouth once daily. 90 tablet 3    GARLIC ORAL Take by mouth.      mometasone 0.1% (ELOCON) 0.1 % cream Apply topically once daily.  "45 Tube 5    mometasone 0.1% (ELOCON) 0.1 % cream Apply topically once daily. 45 each 5    omega-3 fatty acids/fish oil (FISH OIL-OMEGA-3 FATTY ACIDS) 300-1,000 mg capsule Take by mouth once daily.      sildenafiL (VIAGRA) 100 MG tablet Take 1 tablet (100 mg total) by mouth daily as needed for Erectile Dysfunction. 30 tablet 2     No current facility-administered medications on file prior to visit.       Physical  Exam  Vitals:    11/15/23 1045   BP: 122/60   BP Location: Right arm   Patient Position: Sitting   Pulse: 85   SpO2: 96%   Weight: 90.6 kg (199 lb 11.8 oz)   Height: 6' 2" (1.88 m)      Body mass index is 25.64 kg/m².  Wt Readings from Last 3 Encounters:   11/15/23 90.6 kg (199 lb 11.8 oz)   01/13/23 92 kg (202 lb 11.4 oz)   09/27/21 87.5 kg (193 lb)       Physical Exam  Vitals and nursing note reviewed.   Constitutional:       General: He is not in acute distress.     Appearance: He is not ill-appearing.   HENT:      Head: Normocephalic and atraumatic.      Right Ear: External ear normal.      Left Ear: External ear normal.      Nose: Nose normal.      Mouth/Throat:      Mouth: Mucous membranes are moist.   Eyes:      Extraocular Movements: Extraocular movements intact.      Conjunctiva/sclera: Conjunctivae normal.   Cardiovascular:      Rate and Rhythm: Normal rate and regular rhythm.      Pulses: Normal pulses.      Heart sounds: No murmur heard.  Pulmonary:      Effort: Pulmonary effort is normal. No respiratory distress.      Breath sounds: No wheezing.   Abdominal:      General: There is no distension.      Palpations: Abdomen is soft. There is no mass.      Tenderness: There is no abdominal tenderness.   Musculoskeletal:         General: No swelling.      Cervical back: Normal range of motion.   Skin:     Coloration: Skin is not jaundiced.      Findings: No rash.   Neurological:      General: No focal deficit present.      Mental Status: He is alert and oriented to person, place, and time. "   Psychiatric:         Mood and Affect: Mood normal.         Thought Content: Thought content normal.         Labs:    Complete Blood Count  Lab Results   Component Value Date    RBC 4.48 (L) 11/20/2023    HGB 13.2 (L) 11/20/2023    HCT 41.1 11/20/2023    MCV 92 11/20/2023    MCH 29.5 11/20/2023    MCHC 32.1 11/20/2023    RDW 13.4 11/20/2023     11/20/2023    MPV 10.5 11/20/2023    GRAN 2.3 11/20/2023    GRAN 46.2 11/20/2023    LYMPH 1.8 11/20/2023    LYMPH 35.9 11/20/2023    MONO 0.6 11/20/2023    MONO 11.4 11/20/2023    EOS 0.3 11/20/2023    BASO 0.03 11/20/2023    EOSINOPHIL 5.7 11/20/2023    BASOPHIL 0.6 11/20/2023    DIFFMETHOD Automated 11/20/2023       Comprehensive Metabolic Panel  Lab Results   Component Value Date     11/20/2023    BUN 13 11/20/2023    CREATININE 0.95 11/20/2023     11/20/2023    K 4.8 11/20/2023     11/20/2023    PROT 8.5 (H) 11/20/2023    ALBUMIN 4.7 11/20/2023    BILITOT 0.8 11/20/2023    AST 30 11/20/2023    ALKPHOS 63 11/20/2023    CO2 29 11/20/2023    ALT 23 11/20/2023    ANIONGAP 9 11/20/2023       TSH  Lab Results   Component Value Date    TSH 1.440 11/20/2023       Imaging:  US Abdomen Complete  Narrative: EXAMINATION:  US ABDOMEN COMPLETE    CLINICAL HISTORY:  Unspecified abdominal pain    COMPARISON:  None    FINDINGS:  Complete abdominal ultrasound performed.    The liver measures 15 cm in length and is homogeneous in echogenicity.  Right hepatic lobe cyst measuring 1.6 cm.  Common bile duct is within normal limits at 4 mm.  No gallstones, gallbladder wall thickening, or focal tenderness over the gallbladder.  The visualized portions of the pancreas, IVC and abdominal aorta are within normal limits.  Kidneys are normal in length measuring 10.5 cm on the right and 11.4 cm on the left.   Mild bilateral cortical thinning.  No hydronephrosis or renal masses identified.  Spleen is unremarkable.  Impression: No significant abnormalities.    Electronically  signed by: Pepe Lloyd MD  Date:    11/20/2023  Time:    14:38      Assessment/Plan:    1. Dysphagia, unspecified type  -     Ambulatory referral/consult to Gastroenterology; Future; Expected date: 11/22/2023    2. Abdominal pain, unspecified abdominal location  -     US Abdomen Complete; Future; Expected date: 11/15/2023    3. Prostate cancer screening  -     PSA, SCREENING; Future; Expected date: 11/15/2023    4. Primary hypertension  -     CBC Auto Differential; Future  -     Comprehensive metabolic panel; Future; Expected date: 11/15/2023  -     TSH; Future; Expected date: 11/15/2023    5. Mixed hyperlipidemia    6. Hyperglycemia  -     HEMOGLOBIN A1C; Future; Expected date: 11/15/2023         Discussed how to stay healthy including: diet, exercise, refraining from smoking and discussed screening exams / tests needed for age, sex and family Hx.      Leo Dotson MD

## 2024-01-03 ENCOUNTER — PATIENT MESSAGE (OUTPATIENT)
Dept: ADMINISTRATIVE | Facility: HOSPITAL | Age: 59
End: 2024-01-03
Payer: COMMERCIAL

## 2024-01-03 ENCOUNTER — PATIENT OUTREACH (OUTPATIENT)
Dept: ADMINISTRATIVE | Facility: HOSPITAL | Age: 59
End: 2024-01-03
Payer: COMMERCIAL

## 2024-01-03 NOTE — LETTER
January 9, 2024    Kike Swartz  [address] [place] [address]             [place] [address]  [place] [place] [facility] [address]  Phone: [phone] [facility] is committed to your overall health.  To help you get the most out of each of your visits, we will review your information to make sure you are up to date on all of your recommended tests and/or procedures.       Laith Marie MD  has found that your chart shows you may be due for:     Colon cancer screening        If you have had any of the above done at another facility, please bring the records or information with you so that your record at [facility] will be complete.  If you would like to schedule any of these, please contact me.     If you are currently taking medication, please bring it with you to your appointment for review.           Thank you for letting us care for you,        Tosha Knott, Care Coordinator  [facility]  Phone:  [phone]  Fax: [phone]

## 2024-01-03 NOTE — PROGRESS NOTES
Care Everywhere updates requested and reviewed.  Immunizations reconciled. Media reports reviewed.  Duplicate HM overrides and  orders removed.  Overdue HM topic chart audit and/or requested.  Overdue lab testing linked to upcoming lab appointments if applies.    Health Maintenance Due   Topic Date Due    HIV Screening  Never done    Colorectal Cancer Screening  Never done    Shingles Vaccine (1 of 2) Never done    RSV Vaccine (Age 60+ and Pregnant patients) (1 - 1-dose 60+ series) Never done

## 2024-02-03 NOTE — TELEPHONE ENCOUNTER
Spoke to pt and he was notified his script was sent to the pharmacy. Pt has an appointment scheduled a follow up appointment.    03-Feb-2024 05:06

## 2024-03-06 RX ORDER — AMLODIPINE BESYLATE 10 MG/1
10 TABLET ORAL DAILY
Qty: 90 TABLET | Refills: 3 | Status: SHIPPED | OUTPATIENT
Start: 2024-03-06

## 2024-04-15 ENCOUNTER — HOSPITAL ENCOUNTER (OUTPATIENT)
Dept: RADIOLOGY | Facility: HOSPITAL | Age: 59
Discharge: HOME | End: 2024-04-15
Attending: FAMILY MEDICINE
Payer: COMMERCIAL

## 2024-04-15 ENCOUNTER — OFFICE VISIT (OUTPATIENT)
Dept: FAMILY MEDICINE | Facility: CLINIC | Age: 59
End: 2024-04-15
Payer: COMMERCIAL

## 2024-04-15 VITALS
HEIGHT: 74 IN | HEART RATE: 65 BPM | WEIGHT: 199.06 LBS | BODY MASS INDEX: 25.55 KG/M2 | TEMPERATURE: 98 F | DIASTOLIC BLOOD PRESSURE: 66 MMHG | SYSTOLIC BLOOD PRESSURE: 130 MMHG | OXYGEN SATURATION: 96 %

## 2024-04-15 DIAGNOSIS — M25.562 CHRONIC PAIN OF LEFT KNEE: ICD-10-CM

## 2024-04-15 DIAGNOSIS — M25.562 CHRONIC PAIN OF LEFT KNEE: Primary | ICD-10-CM

## 2024-04-15 DIAGNOSIS — G89.29 CHRONIC PAIN OF LEFT KNEE: Primary | ICD-10-CM

## 2024-04-15 DIAGNOSIS — G89.29 CHRONIC PAIN OF LEFT KNEE: ICD-10-CM

## 2024-04-15 PROCEDURE — 1159F MED LIST DOCD IN RCRD: CPT | Mod: CPTII,S$GLB,, | Performed by: FAMILY MEDICINE

## 2024-04-15 PROCEDURE — 1160F RVW MEDS BY RX/DR IN RCRD: CPT | Mod: CPTII,S$GLB,, | Performed by: FAMILY MEDICINE

## 2024-04-15 PROCEDURE — 3008F BODY MASS INDEX DOCD: CPT | Mod: CPTII,S$GLB,, | Performed by: FAMILY MEDICINE

## 2024-04-15 PROCEDURE — 20610 DRAIN/INJ JOINT/BURSA W/O US: CPT | Mod: LT,S$GLB,, | Performed by: FAMILY MEDICINE

## 2024-04-15 PROCEDURE — 3078F DIAST BP <80 MM HG: CPT | Mod: CPTII,S$GLB,, | Performed by: FAMILY MEDICINE

## 2024-04-15 PROCEDURE — 73562 X-RAY EXAM OF KNEE 3: CPT | Mod: 26,LT,, | Performed by: RADIOLOGY

## 2024-04-15 PROCEDURE — 99213 OFFICE O/P EST LOW 20 MIN: CPT | Mod: 25,S$GLB,, | Performed by: FAMILY MEDICINE

## 2024-04-15 PROCEDURE — 73562 X-RAY EXAM OF KNEE 3: CPT | Mod: TC,FY,PN,LT

## 2024-04-15 PROCEDURE — 3075F SYST BP GE 130 - 139MM HG: CPT | Mod: CPTII,S$GLB,, | Performed by: FAMILY MEDICINE

## 2024-04-15 NOTE — PROCEDURES
Large Joint Aspiration/Injection: L knee    Date/Time: 4/15/2024 1:30 PM    Performed by: Laith Marie MD  Authorized by: Laith Marie MD    Consent Done?:  Yes (Verbal)  Indications:  Arthritis  Site marked: the procedure site was marked    Timeout: prior to procedure the correct patient, procedure, and site was verified    Prep: patient was prepped and draped in usual sterile fashion    Local anesthesia used?: No      Details:  Needle Size:  22 G  Approach:  Anterior  Location:  Knee  Site:  L knee  Medications:  10 mg triamcinolone acetonide 10 mg/mL  Patient tolerance:  Patient tolerated the procedure well with no immediate complications

## 2024-04-15 NOTE — LETTER
April 15, 2024      [address] [address]  Phone: [phone]  Fax: [phone]       Patient: Kike Swartz   YOB: 1965  Date of Visit: 04/15/2024    To Whom It May Concern:    Lupillo Swartz  was at [facility] [employer] on 04/15/2024. The patient may return to work on April 16 with no restrictions. If you have any questions or concerns, or if I can be of further assistance, please do not hesitate to contact me.    Sincerely,    Laith Marie MD

## 2024-04-15 NOTE — PROGRESS NOTES
"Subjective:      Patient ID: Kike Swartz is a 58 y.o. male.    Chief Complaint: Knee Pain (Left knee)      Vitals:    04/15/24 1302   BP: 130/66   Pulse: 65   Temp: 97.8 °F (36.6 °C)   TempSrc: Oral   SpO2: 96%   Weight: 90.3 kg (199 lb 1.2 oz)   Height: 6' 2" (1.88 m)        HPI   Left knee pain swelling and off and on for over one year; trouble walking, took some ibuprofen; supervisor for [employer]  Right knee ok  No hx of surgery  No falls      Problem List  Patient Active Problem List   Diagnosis    Hypertension    Hyperglycemia    Erectile dysfunction    Family history of prostate cancer    Hyperlipidemia    Rash    Degeneration of intervertebral disc at L5-S1 level        ALLERGIES:   Review of patient's allergies indicates:   Allergen Reactions    Ace inhibitors      angioedema       MEDS:   Current Outpatient Medications:     amLODIPine (NORVASC) 10 MG tablet, Take 1 tablet (10 mg total) by mouth once daily., Disp: 90 tablet, Rfl: 3    atorvastatin (LIPITOR) 20 MG tablet, Take 1 tablet (20 mg total) by mouth once daily. At night for cholesterol, Disp: 90 tablet, Rfl: 3    cholecalciferol, vitamin D3, (VITAMIN D3) 5,000 unit Tab, Take 1 tablet (5,000 Units total) by mouth once daily., Disp: 90 tablet, Rfl: 3    GARLIC ORAL, Take by mouth., Disp: , Rfl:     mometasone 0.1% (ELOCON) 0.1 % cream, Apply topically once daily., Disp: 45 each, Rfl: 5    omega-3 fatty acids/fish oil (FISH OIL-OMEGA-3 FATTY ACIDS) 300-1,000 mg capsule, Take by mouth once daily., Disp: , Rfl:     sildenafiL (VIAGRA) 100 MG tablet, Take 1 tablet (100 mg total) by mouth daily as needed for Erectile Dysfunction., Disp: 30 tablet, Rfl: 2    Current Facility-Administered Medications:     triamcinolone acetonide injection 10 mg, 10 mg, Intra-articular, , Laith Marie MD, 10 mg at 04/15/24 1330      History:  Current Providers as of 4/15/2024  PCP: Laith Marie MD  Care Team Provider: Tosha Knott, " LPN  Encounter Provider: Laith Marie MD, starting on Mon Apr 15, 2024 12:00 AM  Referring Provider: not found, starting on Mon Apr 15, 2024 12:00 AM  Consulting Physician: Laith Marie MD, starting on Mon Apr 15, 2024  1:01 PM (Active)   Past Medical History:   Diagnosis Date    Hypertension      Past Surgical History:   Procedure Laterality Date    COLONOSCOPY  2011    Dr Marin      Social History     Tobacco Use    Smoking status: Former     Current packs/day: 0.00     Average packs/day: 1 pack/day for 19.5 years (19.5 ttl pk-yrs)     Types: Cigarettes     Start date: 1980     Quit date: 2000     Years since quittin.3     Passive exposure: Never    Smokeless tobacco: Former   Substance Use Topics    Alcohol use: Yes     Alcohol/week: 6.0 standard drinks of alcohol     Types: 6 Cans of beer per week     Comment: social    Drug use: No         Review of Systems   Constitutional:  Negative for appetite change, fatigue, fever and unexpected weight change.   HENT:  Negative for congestion, ear pain, sinus pressure and sore throat.    Eyes:  Negative for pain and visual disturbance.   Respiratory:  Negative for shortness of breath.    Cardiovascular:  Negative for chest pain.   Gastrointestinal:  Negative for abdominal pain, constipation and diarrhea.   Endocrine: Negative for polyuria.   Genitourinary:  Negative for difficulty urinating and frequency.   Musculoskeletal:  Positive for arthralgias and gait problem. Negative for back pain and myalgias.   Skin:  Negative for color change.   Allergic/Immunologic: Negative.    Neurological:  Negative for syncope, weakness and headaches.   Hematological:  Does not bruise/bleed easily.   Psychiatric/Behavioral:  Negative for dysphoric mood and suicidal ideas. The patient is not nervous/anxious.    All other systems reviewed and are negative.    Objective:     Physical Exam  Vitals and nursing note reviewed.   Constitutional:       General: He is  not in acute distress.     Appearance: Normal appearance. He is well-developed. He is not diaphoretic.   HENT:      Head: Normocephalic and atraumatic.      Right Ear: External ear normal.      Left Ear: External ear normal.      Mouth/Throat:      Pharynx: No oropharyngeal exudate.   Eyes:      General: No scleral icterus.        Right eye: No discharge.         Left eye: No discharge.      Conjunctiva/sclera: Conjunctivae normal.      Pupils: Pupils are equal, round, and reactive to light.   Neck:      Thyroid: No thyromegaly.      Vascular: No JVD.      Trachea: No tracheal deviation.   Cardiovascular:      Rate and Rhythm: Normal rate and regular rhythm.      Heart sounds: No murmur heard.     No friction rub. No gallop.   Pulmonary:      Effort: Pulmonary effort is normal. No respiratory distress.      Breath sounds: Normal breath sounds. No stridor. No wheezing or rales.   Chest:      Chest wall: No tenderness.   Abdominal:      General: There is no distension.      Palpations: Abdomen is soft. There is no mass.      Tenderness: There is no abdominal tenderness. There is no guarding or rebound.   Musculoskeletal:         General: Swelling and tenderness present. Normal range of motion.      Cervical back: Normal range of motion and neck supple.   Lymphadenopathy:      Cervical: No cervical adenopathy.   Skin:     General: Skin is warm and dry.      Coloration: Skin is not pale.      Findings: No erythema or rash.   Neurological:      General: No focal deficit present.      Mental Status: He is alert and oriented to person, place, and time. Mental status is at baseline.      Cranial Nerves: No cranial nerve deficit.      Motor: No abnormal muscle tone.      Coordination: Coordination normal.      Deep Tendon Reflexes: Reflexes are normal and symmetric. Reflexes normal.   Psychiatric:         Mood and Affect: Mood normal.         Behavior: Behavior normal.         Thought Content: Thought content normal.          Judgment: Judgment normal.             Assessment:     1. Chronic pain of left knee      Plan:        Medication List            Accurate as of April 15, 2024 11:59 PM. If you have any questions, ask your nurse or doctor.                CONTINUE taking these medications      amLODIPine 10 MG tablet  Commonly known as: NORVASC  Take 1 tablet (10 mg total) by mouth once daily.     atorvastatin 20 MG tablet  Commonly known as: LIPITOR  Take 1 tablet (20 mg total) by mouth once daily. At night for cholesterol     cholecalciferol (vitamin D3) 125 mcg (5,000 unit) Tab  Commonly known as: VITAMIN D3  Take 1 tablet (5,000 Units total) by mouth once daily.     fish oil-omega-3 fatty acids 300-1,000 mg capsule     GARLIC ORAL     mometasone 0.1% 0.1 % cream  Commonly known as: ELOCON  Apply topically once daily.     sildenafiL 100 MG tablet  Commonly known as: VIAGRA  Take 1 tablet (100 mg total) by mouth daily as needed for Erectile Dysfunction.            Chronic pain of left knee  -     URIC ACID; Future; Expected date: 04/15/2024  -     X-Ray Knee 3 View Left; Future; Expected date: 04/15/2024  -     Large Joint Aspiration/Injection: L knee  -     triamcinolone acetonide injection 10 mg

## 2024-04-25 RX ORDER — SILDENAFIL 100 MG/1
100 TABLET, FILM COATED ORAL DAILY PRN
Qty: 30 TABLET | Refills: 2 | Status: CANCELLED | OUTPATIENT
Start: 2024-04-25 | End: 2025-04-25

## 2024-04-25 NOTE — TELEPHONE ENCOUNTER
No care due was identified.  [facility] Embedded Care Due Messages. Reference number: [id number].   4/25/2024 2:55:18 PM CDT

## 2024-04-26 NOTE — TELEPHONE ENCOUNTER
No care due was identified.  [facility] Embedded Care Due Messages. Reference number: [id number].   4/26/2024 1:11:53 PM CDT

## 2024-04-27 RX ORDER — SILDENAFIL 100 MG/1
100 TABLET, FILM COATED ORAL DAILY PRN
Qty: 30 TABLET | Refills: 2 | Status: SHIPPED | OUTPATIENT
Start: 2024-04-27 | End: 2025-04-27

## 2024-07-02 ENCOUNTER — PATIENT MESSAGE (OUTPATIENT)
Dept: FAMILY MEDICINE | Facility: CLINIC | Age: 59
End: 2024-07-02
Payer: COMMERCIAL

## 2024-09-07 RX ORDER — ATORVASTATIN CALCIUM 20 MG/1
TABLET, FILM COATED ORAL
Qty: 90 TABLET | Refills: 0 | Status: SHIPPED | OUTPATIENT
Start: 2024-09-07 | End: 2024-09-10 | Stop reason: SDUPTHER

## 2024-09-07 NOTE — TELEPHONE ENCOUNTER
Provider Staff:  Action required for this patient    Requires labs      Please see care gap opportunities below in Care Due Message.    Thanks!  [facility]     Appointments      Date Provider   Last Visit   4/15/2024 Laith Marie MD   Next Visit   Visit date not found Laith Marie MD     Refill Decision Note   Kike Swartz  is requesting a refill authorization.  Brief Assessment and Rationale for Refill:  Approve     Medication Therapy Plan:        Comments:     Note composed:7:09 AM 09/07/2024

## 2024-09-07 NOTE — TELEPHONE ENCOUNTER
Care Due:                  Date            Visit Type   Department     Provider  --------------------------------------------------------------------------------                                EP -                              PRIMARY      [facility]  Last Visit: 04-      CARE ([facility])   MEDICINE       Laith Marie  Next Visit: None Scheduled  None         None Found                                                            Last  Test          Frequency    Reason                     Performed    Due Date  --------------------------------------------------------------------------------    CMP.........  12 months..  atorvastatin.............  11- 11-    Lipid Panel.  12 months..  atorvastatin.............  11- 11-    [employer] Embedded Care Due Messages. Reference number: [id number].   9/07/2024 7:02:33 AM CDT

## 2024-09-10 NOTE — TELEPHONE ENCOUNTER
No care due was identified.  [facility] Embedded Care Due Messages. Reference number: [id number].   9/10/2024 9:35:50 AM CDT

## 2024-09-11 RX ORDER — ATORVASTATIN CALCIUM 20 MG/1
20 TABLET, FILM COATED ORAL NIGHTLY
Qty: 90 TABLET | Refills: 3 | Status: SHIPPED | OUTPATIENT
Start: 2024-09-11

## 2024-09-20 NOTE — TELEPHONE ENCOUNTER
No care due was identified.  [facility] Embedded Care Due Messages. Reference number: [id number].   9/20/2024 5:52:32 PM CDT

## 2024-09-22 RX ORDER — SILDENAFIL 100 MG/1
100 TABLET, FILM COATED ORAL DAILY PRN
Qty: 30 TABLET | Refills: 2 | Status: SHIPPED | OUTPATIENT
Start: 2024-09-22 | End: 2025-09-22

## 2024-10-28 ENCOUNTER — E-CONSULT (OUTPATIENT)
Dept: GASTROENTEROLOGY | Facility: HOSPITAL | Age: 59
End: 2024-10-28
Payer: COMMERCIAL

## 2024-10-28 ENCOUNTER — PATIENT MESSAGE (OUTPATIENT)
Dept: FAMILY MEDICINE | Facility: CLINIC | Age: 59
End: 2024-10-28

## 2024-10-28 ENCOUNTER — LAB VISIT (OUTPATIENT)
Dept: LAB | Facility: HOSPITAL | Age: 59
End: 2024-10-28
Attending: STUDENT IN AN ORGANIZED HEALTH CARE EDUCATION/TRAINING PROGRAM
Payer: COMMERCIAL

## 2024-10-28 ENCOUNTER — OFFICE VISIT (OUTPATIENT)
Dept: FAMILY MEDICINE | Facility: CLINIC | Age: 59
End: 2024-10-28
Payer: COMMERCIAL

## 2024-10-28 VITALS
SYSTOLIC BLOOD PRESSURE: 138 MMHG | DIASTOLIC BLOOD PRESSURE: 72 MMHG | WEIGHT: 200.75 LBS | OXYGEN SATURATION: 99 % | HEART RATE: 58 BPM | TEMPERATURE: 98 F | HEIGHT: 74 IN | BODY MASS INDEX: 25.76 KG/M2

## 2024-10-28 DIAGNOSIS — K21.9 GASTROESOPHAGEAL REFLUX DISEASE, UNSPECIFIED WHETHER ESOPHAGITIS PRESENT: Primary | ICD-10-CM

## 2024-10-28 DIAGNOSIS — Z13.6 SCREENING FOR CARDIOVASCULAR CONDITION: ICD-10-CM

## 2024-10-28 DIAGNOSIS — K21.9 GASTROESOPHAGEAL REFLUX DISEASE WITHOUT ESOPHAGITIS: ICD-10-CM

## 2024-10-28 DIAGNOSIS — I10 PRIMARY HYPERTENSION: ICD-10-CM

## 2024-10-28 DIAGNOSIS — R73.9 HYPERGLYCEMIA: ICD-10-CM

## 2024-10-28 DIAGNOSIS — Z12.5 PROSTATE CANCER SCREENING: ICD-10-CM

## 2024-10-28 DIAGNOSIS — Z12.11 COLON CANCER SCREENING: Primary | ICD-10-CM

## 2024-10-28 LAB
ALBUMIN SERPL BCP-MCNC: 4.6 G/DL (ref 3.5–5.2)
ALP SERPL-CCNC: 63 U/L (ref 38–126)
ALT SERPL W/O P-5'-P-CCNC: 19 U/L (ref 10–44)
ANION GAP SERPL CALC-SCNC: 3 MMOL/L (ref 8–16)
AST SERPL-CCNC: 26 U/L (ref 15–46)
BASOPHILS # BLD AUTO: 0.03 K/UL (ref 0–0.2)
BASOPHILS NFR BLD: 0.6 % (ref 0–1.9)
BILIRUB SERPL-MCNC: 0.6 MG/DL (ref 0.1–1)
CALCIUM SERPL-MCNC: 9.5 MG/DL (ref 8.7–10.5)
CHLORIDE SERPL-SCNC: 104 MMOL/L (ref 95–110)
CHOLEST SERPL-MCNC: 193 MG/DL (ref 120–199)
CHOLEST/HDLC SERPL: 3.1 {RATIO} (ref 2–5)
CO2 SERPL-SCNC: 33 MMOL/L (ref 23–29)
COMPLEXED PSA SERPL-MCNC: 0.92 NG/ML (ref 0–4)
CREAT SERPL-MCNC: 0.88 MG/DL (ref 0.5–1.4)
DIFFERENTIAL METHOD BLD: ABNORMAL
EOSINOPHIL # BLD AUTO: 0.2 K/UL (ref 0–0.5)
EOSINOPHIL NFR BLD: 4.4 % (ref 0–8)
ERYTHROCYTE [DISTWIDTH] IN BLOOD BY AUTOMATED COUNT: 13.2 % (ref 11.5–14.5)
EST. GFR  (NO RACE VARIABLE): >60 ML/MIN/1.73 M^2
ESTIMATED AVG GLUCOSE: 134 MG/DL (ref 68–131)
GLUCOSE SERPL-MCNC: 106 MG/DL (ref 70–110)
HBA1C MFR BLD: 6.3 % (ref 4–5.6)
HCT VFR BLD AUTO: 41.9 % (ref 40–54)
HDLC SERPL-MCNC: 62 MG/DL (ref 40–75)
HDLC SERPL: 32.1 % (ref 20–50)
HGB BLD-MCNC: 13.6 G/DL (ref 14–18)
IMM GRANULOCYTES # BLD AUTO: 0.01 K/UL (ref 0–0.04)
IMM GRANULOCYTES NFR BLD AUTO: 0.2 % (ref 0–0.5)
LDLC SERPL CALC-MCNC: 111.8 MG/DL (ref 63–159)
LYMPHOCYTES # BLD AUTO: 1.6 K/UL (ref 1–4.8)
LYMPHOCYTES NFR BLD: 32.6 % (ref 18–48)
MCH RBC QN AUTO: 29.6 PG (ref 27–31)
MCHC RBC AUTO-ENTMCNC: 32.5 G/DL (ref 32–36)
MCV RBC AUTO: 91 FL (ref 82–98)
MONOCYTES # BLD AUTO: 0.5 K/UL (ref 0.3–1)
MONOCYTES NFR BLD: 10.2 % (ref 4–15)
NEUTROPHILS # BLD AUTO: 2.5 K/UL (ref 1.8–7.7)
NEUTROPHILS NFR BLD: 52 % (ref 38–73)
NONHDLC SERPL-MCNC: 131 MG/DL
NRBC BLD-RTO: 0 /100 WBC
PLATELET # BLD AUTO: 242 K/UL (ref 150–450)
PMV BLD AUTO: 10.8 FL (ref 9.2–12.9)
POTASSIUM SERPL-SCNC: 4.5 MMOL/L (ref 3.5–5.1)
PROT SERPL-MCNC: 8 G/DL (ref 6–8.4)
RBC # BLD AUTO: 4.6 M/UL (ref 4.6–6.2)
SODIUM SERPL-SCNC: 140 MMOL/L (ref 136–145)
TRIGL SERPL-MCNC: 96 MG/DL (ref 30–150)
TSH SERPL DL<=0.005 MIU/L-ACNC: 1.28 UIU/ML (ref 0.4–4)
UUN UR-MCNC: 10 MG/DL (ref 2–20)
WBC # BLD AUTO: 4.82 K/UL (ref 3.9–12.7)

## 2024-10-28 PROCEDURE — 80061 LIPID PANEL: CPT | Performed by: STUDENT IN AN ORGANIZED HEALTH CARE EDUCATION/TRAINING PROGRAM

## 2024-10-28 PROCEDURE — 85025 COMPLETE CBC W/AUTO DIFF WBC: CPT | Mod: PN | Performed by: STUDENT IN AN ORGANIZED HEALTH CARE EDUCATION/TRAINING PROGRAM

## 2024-10-28 PROCEDURE — 83036 HEMOGLOBIN GLYCOSYLATED A1C: CPT | Performed by: STUDENT IN AN ORGANIZED HEALTH CARE EDUCATION/TRAINING PROGRAM

## 2024-10-28 PROCEDURE — 99451 NTRPROF PH1/NTRNET/EHR 5/>: CPT | Mod: ,,, | Performed by: INTERNAL MEDICINE

## 2024-10-28 PROCEDURE — 80053 COMPREHEN METABOLIC PANEL: CPT | Mod: PN | Performed by: STUDENT IN AN ORGANIZED HEALTH CARE EDUCATION/TRAINING PROGRAM

## 2024-10-28 PROCEDURE — 84443 ASSAY THYROID STIM HORMONE: CPT | Mod: PN | Performed by: STUDENT IN AN ORGANIZED HEALTH CARE EDUCATION/TRAINING PROGRAM

## 2024-10-28 PROCEDURE — 84153 ASSAY OF PSA TOTAL: CPT | Performed by: STUDENT IN AN ORGANIZED HEALTH CARE EDUCATION/TRAINING PROGRAM

## 2024-10-28 RX ORDER — PANTOPRAZOLE SODIUM 40 MG/1
40 TABLET, DELAYED RELEASE ORAL DAILY
Qty: 90 TABLET | Refills: 3 | Status: SHIPPED | OUTPATIENT
Start: 2024-10-28

## 2024-10-29 ENCOUNTER — TELEPHONE (OUTPATIENT)
Dept: ENDOSCOPY | Facility: HOSPITAL | Age: 59
End: 2024-10-29
Payer: COMMERCIAL

## 2024-11-06 ENCOUNTER — PATIENT MESSAGE (OUTPATIENT)
Dept: FAMILY MEDICINE | Facility: CLINIC | Age: 59
End: 2024-11-06
Payer: COMMERCIAL

## 2024-11-25 ENCOUNTER — CLINICAL SUPPORT (OUTPATIENT)
Dept: ENDOSCOPY | Facility: HOSPITAL | Age: 59
End: 2024-11-25
Attending: STUDENT IN AN ORGANIZED HEALTH CARE EDUCATION/TRAINING PROGRAM
Payer: COMMERCIAL

## 2024-11-25 DIAGNOSIS — Z12.11 COLON CANCER SCREENING: ICD-10-CM

## 2024-11-25 NOTE — PLAN OF CARE
Called pt. For PAT call to schedule a Colonoscopy and he did not answer. [facility] Also sent MY O msg. To pt's portal. EC

## 2025-03-06 RX ORDER — AMLODIPINE BESYLATE 10 MG/1
10 TABLET ORAL DAILY
Qty: 90 TABLET | Refills: 3 | Status: SHIPPED | OUTPATIENT
Start: 2025-03-06

## 2025-03-06 RX ORDER — AMLODIPINE BESYLATE 10 MG/1
10 TABLET ORAL
Qty: 90 TABLET | Refills: 0 | OUTPATIENT
Start: 2025-03-06

## 2025-03-06 NOTE — TELEPHONE ENCOUNTER
Refill Decision Note   Kike Swartz  is requesting a refill authorization.  Brief Assessment and Rationale for Refill:  Quick Discontinue     Medication Therapy Plan:         Comments:     Note composed:8:40 AM 03/06/2025

## 2025-03-28 NOTE — TELEPHONE ENCOUNTER
No care due was identified.  [facility] Embedded Care Due Messages. Reference number: [id number].   3/28/2025 4:30:02 PM CDT

## 2025-03-30 RX ORDER — SILDENAFIL 100 MG/1
100 TABLET, FILM COATED ORAL DAILY PRN
Qty: 30 TABLET | Refills: 2 | Status: SHIPPED | OUTPATIENT
Start: 2025-03-30 | End: 2026-03-30

## 2025-05-13 ENCOUNTER — PATIENT MESSAGE (OUTPATIENT)
Dept: FAMILY MEDICINE | Facility: CLINIC | Age: 60
End: 2025-05-13
Payer: COMMERCIAL

## 2025-05-13 RX ORDER — METFORMIN HYDROCHLORIDE 500 MG/1
500 TABLET ORAL
Qty: 90 TABLET | Refills: 3 | Status: SHIPPED | OUTPATIENT
Start: 2025-05-13 | End: 2025-05-13 | Stop reason: SDUPTHER

## 2025-05-13 NOTE — TELEPHONE ENCOUNTER
Care Due:                  Date            Visit Type   Department     Provider  --------------------------------------------------------------------------------                                MYCHART                              ANNUAL                              CHECKUP/PHY  [facility]  Last Visit: 10-      S            MEDICINE       Leo Dotson                              EP -                              PRIMARY      [facility]  Next Visit: 11-      CARE ([facility])   MEDICINE       Laith Marie                                                            Last  Test          Frequency    Reason                     Performed    Due Date  --------------------------------------------------------------------------------    HBA1C.......  6 months...  metFORMIN................  10-   04-    [facility] Embedded Care Due Messages. Reference number: [id number].   5/13/2025 5:51:37 PM CDT

## 2025-05-14 RX ORDER — METFORMIN HYDROCHLORIDE 500 MG/1
500 TABLET ORAL
Qty: 90 TABLET | Refills: 3 | Status: SHIPPED | OUTPATIENT
Start: 2025-05-14 | End: 2026-05-14

## 2025-07-09 ENCOUNTER — PATIENT OUTREACH (OUTPATIENT)
Dept: ADMINISTRATIVE | Facility: HOSPITAL | Age: 60
End: 2025-07-09
Payer: COMMERCIAL

## 2025-07-09 NOTE — PROGRESS NOTES
History of present illness  This is a 66-year-old male with history of low back pain for several years, now with radiculopathy to the posterior aspect of the legs bilaterally, numbness sensation, tingling sensation, no sphincter compromises.  Valsalva maneuvers do not elicit his pain.  His visual analog pain score is 8/10.  His pain is worse on exertion alleviated by rest.  He underwent lumbar laminectomy more than 10 years ago.  He has an EMG of the bilateral lower  extremities performed on August 3 of 2017 that shows polyneuropathy, no radiculopathy.  He underwent bilateral lumbar facet joint medial branches radiofrequency neurolysis more than six months ago  with excellent relief.      Report  MR Spine Cervical w/o Contrast    TECHNIQUE: MR Spine Cervical w/o Contrast. MRI of the cervical spine  was performed without venous contrast.    CLINICAL INDICATION:  PITUITARY TUMOR-ATAXIC GAIT-MYELOPATHY    COMPARISON: 09/28/2013.    FINDINGS:    There is severe multilevel degenerative change including vertebral  body osteophytes.  There is a multilevel  disc degeneration with  related  vertebral endplate signal abnormalities.  There is also  multilevel facet degenerative change.  No evidence for an acute subluxation.  No unusual signal detected within the cervical vertebral bodies.    IMPRESSION:    C2-C3 level: No evidence for significant to stenosis of the central  spinal canal or neural foramina.    C3-C4 level:  Substantial posterior disc osteophyte complex that  results in the severe central spinal canal stenosis.  There is some  moderate the deformity of the anterior margin of the cervical spinal  cord at this level.  Disc osteophyte complexes at similar to the prior  study.  There is some new ill-defined increased signal within the  cervical spinal cord which may be due to myelomalacia or cord edema.    C4-C5 level:  There is a a moderate the posterior disc osteophyte  complex resulting in the mild to  Population Health Chart Review & Patient Outreach Details      Additional [facility] Health Notes:               Updates Requested / Reviewed:      Updated Care Coordination Note, Care Everywhere, and Immunizations Reconciliation Completed or Queried: [facility] Topics Overdue:      [facility] Score: 1     Colon Cancer Screening    Pneumonia Vaccine  Shingles/Zoster Vaccine  RSV Vaccine                  Health Maintenance Topic(s) Outreach Outcomes & Actions Taken:    Colorectal Cancer Screening - Outreach Outcomes & Actions Taken  : portal message sent     moderate central spinal canal  stenosis.  There is slight deformity the anterior margin of the  cervical spinal cord at this level.  The disc osteophyte complex  slightly larger the prior study.  New since the earlier examination is  some faint ill-defined increased is signal within the cervical spinal  cord at this disc level.  This may be due to myelomalacia edema.    C5-C6 level:  Stable left paracentral and left foraminal lobe disc  protrusion.  Mild central spinal canal stenosis.    C6-C7 level:  Stable diffuse disc bulge.  Mild bilateral neural  foramen stenosis.    C7-T1 level:  No evidence for significant to stenosis of the central  spinal canal or neural foramina.    Signature Line  *** Final ***    Dictated by:  Ligia Damon MD  Electronically Signed By:  Ligia Damon MD  on  03/20/2019 11:20  Technologist Initials:  ED        Report  PROCEDURE: MR Spine Lumbar w/o Contrast    CLINICAL INDICATION: Right sided sciatica and radiculopathy.    TECHNIQUE: Multisequence axial and sagittal MRI images of the lumbar spine were obtained.     CONTRAST: None    COMPARISON: Lumbar spine radiographs dated 07/25/2017.    FINDINGS: For counting purposes, the last complete intervertebral space is considered L5-S1. The conus medullaris terminates at L1-L2.    The lumbar lordosis is maintained. No listhesis is identified. There are mild scattered Modic type endplate degenerative changes. There is also a small Schmorl's node within the superior endplate of L3. There is multilevel anterior osteophytosis. There are postsurgical changes of prior L4-L5 laminectomies with mild overlying scarring.     There is no significant disc bulge at T11-T12 or T12-L1.    At L1-L2, there is a mild diffuse disc bulge with mild ligamentous and facet hypertrophy. No significant central canal or neuroforaminal stenosis results.    At L2-L3, there is a prominent posterior disc osteophyte complex with diffuse disc bulge as well as ligamentous and  facet hypertrophy contributing to mild to moderate central canal stenosis. Mild left and moderate right neural foraminal stenosis results.    At L3-L4, there is near obliteration of the intervertebral disc space with a prominent posterior disc osteophyte complex as well as facet hypertrophy contributing to mild central canal stenosis. There is severe bilateral neural foraminal stenosis at this level.    At L4-L5, there is near obliteration of the intervertebral disc space with a prominent posterior disc osteophyte complex as well as uncinate hypertrophy. The overlying laminectomy defect is noted and there is no significant central canal stenosis. There is moderate to severe right and moderate left neural foraminal stenosis.    At L5-S1, there is a moderate diffuse disc bulge with right paracentral and foraminal disc protrusion as well as posterior osteophyte formation contributing to minimal central canal stenosis as well as severe left and moderate to severe right neural foraminal stenosis with likely nerve root impingement.     There is generalized loss of intervertebral disc signal and height consistent with degenerative disc disease. Multilevel anterior osteophyte formation is noted.    The visualized spinal cord is normal in signal intensity. There is no cord contusion. There is no intradural extramedullary lesion.     IMPRESSION:     1.  Severe multilevel lumbar spondylosis with L4-L5 laminectomy defects as above.    2.  Additional neural foraminal stenoses from L3-L4 through L5-S1 as above.      Signature Line  *** Final ***    Dictated by:  Carrillo Crhistiansen MD  Electronically Signed By:  Carrillo Christiansen MD  on  10/03/2017 10:44  Technologist Initials:  ED          Allergies (2) Active Reaction  naproxen None Documented  NSAID Hives      Patient History: Home Medications  cyclobenzaprine (Flexeril 10 mg oral tablet) 10 mg = 1 tab(s), PO, Tab, TID, PRN for spasm, # 30 tab(s)  11/09/2017 11:08   Misc Medication (Vit. D 1000u) Vit. D 1000u, See Instructions  11/09/2017 11:08  spironolactone (spironolactone 25 mg oral tablet) 25 mg = 1 tab, PO, BID  11/09/2017 11:08  acetaminophen-diphenhydramine (Tylenol PM) 30 mL, PO, qHS, PRN as needed for sleep  11/09/2017 11:08  pantoprazole (pantoprazole 40 mg oral EC tablet) 40 mg = 1 tab, PO, qDay  11/09/2017 11:08  amLODIPine 10 mg =, PO, qDay  11/09/2017 11:06  baclofen (baclofen 10 mg oral tablet) 10 mg = 1 tab, PO, qHS, PRN Agitation  11/09/2017 11:06  acetaminophen-hydrocodone (Norco 325 mg-7.5 mg oral tablet) 1 tab(s), PO, q6hr, PRN for pain, [id number]  Dr. Genevieve Irene, # 16 tab, 0 Refill(s), Tab  08/23/2017 19:26  pantoprazole (Protonix IV 40 mg injection) 40 mg = 1 vial, IV Push, Injection, qDayACB, # 30 tab, 0 Refill(s)  11/30/2015 13:24  metoprolol (Lopressor 100 mg oral tablet) 100 mg = 1 tab, PO, Tablet, BID, # 180 tab  11/30/2015 05:13    Past medical and surgical history  Diabetes, depression, bipolar, high blood pressure, carpal tunnel syndrome, chronic kidney disease.  Prostate resection 15 years ago, lumbar laminectomy more than 15 years ago, carpal tunnel release September 13, 2017.      Family history  Diabetes      Social history  The patient denies use of illicit drugs or drinking, he refers smoking half a pack a day for 40 years.  No history of detox.      Review of systems  The patient denies fever, nausea and vomiting.  No new signs and symptoms from the constitutional, eyes, ears, nose, throat, cardiovascular, respiratory, gastrointestinal, genitourinary, musculoskeletal, skin, neurologic, psychiatric, endocrine, hematologic and immunologic systems.      Physical exam  This is an alert and well-nourished patient in no acute distress at the moment of exam.    Vital signs:  Per Cerner    , Height : 5 feet 6, Weight: 88 kg, BMI: 31.5.    HEENT: No icterus no nystagmus.    Neck: Normal on inspection, palpation and  auscultation    Lungs: Good  air entry bilateral.      Heart: S1, S2, no murmurs, no gallops, no added sounds.    Abdomen: Soft, no rebound tenderness.    Neurologic: Cranial nerves II through XII are grossly intact.  Deep tendon reflexes are normal 2/4.  Strength of the upper and lower extremities is normal 5/5.  No signs of allodynia or trophic changes.    Musculoskeletal:    Range of motion of the lumbar spine is normal.  Straight leg raising test is positive at 30° bilateral.  There is no pain to palpation of the lumbar spinous processes.  There is  pain to palpation of the bilateral lumbar facet joints.  There is no pain on palpation of bilateral paraspinal muscles.  Magen maneuver is negative bilateral.  There is no pain to palpation of bilateral sacroiliac joints.  Gaelen's maneuver is negative bilaterally.    Extremities: Peripheral pulses are present.  There is no edema.      Assessment and plan  This is a 66-year-old male with history of low back pain for several years, now with radiculopathy to the posterior aspect of the legs bilaterally, numbness sensation, tingling sensation, no sphincter compromises.  Valsalva maneuvers do not elicit his pain.  His visual analog pain score is 8/10.  His pain is worse on exertion alleviated by rest.  He underwent lumbar laminectomy more than 10 years ago.  He has an EMG of the bilateral lower  extremities performed on August 3 of 2017 that shows polyneuropathy, no radiculopathy.  He underwent bilateral lumbar facet joint medial branches radiofrequency neurolysis more than six months ago  with excellent relief.  During the physical exam I can reproduce his pain at palpation of the bilateral lumbar facet joints, for what I will schedule the patient to have a bilateral lumbar facet joint medial branches radiofrequency neurolysis one side at a time under fluoroscopic guidance under sedation.  I went over the procedure with the patient, potential benefits and complications, including bleeding,  infection, nerve injury, worsening of her pain.  I went over  the procedure and the complications in very simple terms.  I answered all the patient questions.  The patient understood, agrees and wants to proceed. The risks and benefits as well as alternative options were explained to the patient in detail.    As it pertains to any injection there is an inherent risks of bleeding and infection.   If the patient experiences paresthesia during the procedure, it is expected to be transient.   If the patient  experiences numbness or heaviness of the extremity blocked from the procedure, it is expected to be temporary, lasting only for the duration of the local anesthetic.  And as with any pain procedure, there is a possibility that the procedure may not completely relieve the pain to the satisfaction of the patient.  The patient expresses understanding and wishes to proceed.  The patient has cervicalgia, new MRI of the cervical spine shows severe stenosis with decreased signal over the cervical cord, he is awaiting neurosurgical consultation.        Thank you very much.

## 2025-07-23 ENCOUNTER — OFFICE VISIT (OUTPATIENT)
Dept: FAMILY MEDICINE | Facility: CLINIC | Age: 60
End: 2025-07-23
Payer: COMMERCIAL

## 2025-07-23 ENCOUNTER — LAB VISIT (OUTPATIENT)
Dept: LAB | Facility: HOSPITAL | Age: 60
End: 2025-07-23
Attending: PHYSICIAN ASSISTANT
Payer: COMMERCIAL

## 2025-07-23 VITALS
DIASTOLIC BLOOD PRESSURE: 72 MMHG | BODY MASS INDEX: 24.82 KG/M2 | TEMPERATURE: 98 F | OXYGEN SATURATION: 98 % | SYSTOLIC BLOOD PRESSURE: 132 MMHG | HEIGHT: 74 IN | HEART RATE: 71 BPM | WEIGHT: 193.44 LBS

## 2025-07-23 DIAGNOSIS — Z11.4 SCREENING FOR HIV (HUMAN IMMUNODEFICIENCY VIRUS): ICD-10-CM

## 2025-07-23 DIAGNOSIS — R73.9 HYPERGLYCEMIA: ICD-10-CM

## 2025-07-23 DIAGNOSIS — F10.11 HISTORY OF ALCOHOL ABUSE: ICD-10-CM

## 2025-07-23 DIAGNOSIS — Z12.5 SCREENING PSA (PROSTATE SPECIFIC ANTIGEN): ICD-10-CM

## 2025-07-23 DIAGNOSIS — I10 PRIMARY HYPERTENSION: ICD-10-CM

## 2025-07-23 DIAGNOSIS — Z12.11 COLON CANCER SCREENING: ICD-10-CM

## 2025-07-23 DIAGNOSIS — R10.13 EPIGASTRIC ABDOMINAL PAIN: Primary | ICD-10-CM

## 2025-07-23 DIAGNOSIS — R10.13 EPIGASTRIC ABDOMINAL PAIN: ICD-10-CM

## 2025-07-23 LAB
ABSOLUTE EOSINOPHIL (OHS): 0.16 K/UL
ABSOLUTE MONOCYTE (OHS): 0.49 K/UL (ref 0.3–1)
ABSOLUTE NEUTROPHIL COUNT (OHS): 3.31 K/UL (ref 1.8–7.7)
ALBUMIN SERPL BCP-MCNC: 4.7 G/DL (ref 3.5–5.2)
ALBUMIN/CREAT UR: 5.1 UG/MG
ALP SERPL-CCNC: 72 UNIT/L (ref 38–126)
ALT SERPL W/O P-5'-P-CCNC: 23 UNIT/L (ref 10–44)
ANION GAP (OHS): 9 MMOL/L (ref 8–16)
AST SERPL-CCNC: 25 UNIT/L (ref 15–46)
BASOPHILS # BLD AUTO: 0.04 K/UL
BASOPHILS NFR BLD AUTO: 0.7 %
BILIRUB SERPL-MCNC: 0.8 MG/DL (ref 0.1–1)
BILIRUB UR QL STRIP.AUTO: NEGATIVE
BUN SERPL-MCNC: 20 MG/DL (ref 2–20)
CALCIUM SERPL-MCNC: 9.3 MG/DL (ref 8.7–10.5)
CHLORIDE SERPL-SCNC: 102 MMOL/L (ref 95–110)
CHOLEST SERPL-MCNC: 189 MG/DL (ref 120–199)
CHOLEST/HDLC SERPL: 2.7 {RATIO} (ref 2–5)
CLARITY UR: CLEAR
CO2 SERPL-SCNC: 31 MMOL/L (ref 23–29)
COLOR UR AUTO: YELLOW
CREAT SERPL-MCNC: 1.1 MG/DL (ref 0.5–1.4)
CREAT UR-MCNC: 394 MG/DL (ref 23–375)
EAG (OHS): 140 MG/DL (ref 68–131)
ERYTHROCYTE [DISTWIDTH] IN BLOOD BY AUTOMATED COUNT: 13.2 % (ref 11.5–14.5)
GFR SERPLBLD CREATININE-BSD FMLA CKD-EPI: >60 ML/MIN/1.73/M2
GLUCOSE SERPL-MCNC: 110 MG/DL (ref 70–110)
GLUCOSE UR QL STRIP: NEGATIVE
HBA1C MFR BLD: 6.5 % (ref 4–5.6)
HCT VFR BLD AUTO: 42.4 % (ref 40–54)
HDLC SERPL-MCNC: 69 MG/DL (ref 40–75)
HDLC SERPL: 36.5 % (ref 20–50)
HGB BLD-MCNC: 13.8 GM/DL (ref 14–18)
HGB UR QL STRIP: NEGATIVE
HIV 1+2 AB+HIV1 P24 AG SERPL QL IA: NORMAL
IMM GRANULOCYTES # BLD AUTO: 0.01 K/UL (ref 0–0.04)
IMM GRANULOCYTES NFR BLD AUTO: 0.2 % (ref 0–0.5)
KETONES UR QL STRIP: NEGATIVE
LDLC SERPL CALC-MCNC: 104 MG/DL (ref 63–159)
LEUKOCYTE ESTERASE UR QL STRIP: NEGATIVE
LIPASE SERPL-CCNC: 67 U/L (ref 23–300)
LYMPHOCYTES # BLD AUTO: 1.68 K/UL (ref 1–4.8)
MCH RBC QN AUTO: 29.1 PG (ref 27–31)
MCHC RBC AUTO-ENTMCNC: 32.5 G/DL (ref 32–36)
MCV RBC AUTO: 90 FL (ref 82–98)
MICROALBUMIN UR-MCNC: 20 UG/ML (ref ?–5000)
NITRITE UR QL STRIP: NEGATIVE
NONHDLC SERPL-MCNC: 120 MG/DL
NUCLEATED RBC (/100WBC) (OHS): 0 /100 WBC
PH UR STRIP: 6 [PH]
PLATELET # BLD AUTO: 242 K/UL (ref 150–450)
PMV BLD AUTO: 10.7 FL (ref 9.2–12.9)
POTASSIUM SERPL-SCNC: 4.4 MMOL/L (ref 3.5–5.1)
PROT SERPL-MCNC: 8.9 GM/DL (ref 6–8.4)
PROT UR QL STRIP: NEGATIVE
PSA SERPL-MCNC: 1.4 NG/ML
RBC # BLD AUTO: 4.74 M/UL (ref 4.6–6.2)
RELATIVE EOSINOPHIL (OHS): 2.8 %
RELATIVE LYMPHOCYTE (OHS): 29.5 % (ref 18–48)
RELATIVE MONOCYTE (OHS): 8.6 % (ref 4–15)
RELATIVE NEUTROPHIL (OHS): 58.2 % (ref 38–73)
SODIUM SERPL-SCNC: 142 MMOL/L (ref 136–145)
SP GR UR STRIP: >=1.03
TRIGL SERPL-MCNC: 80 MG/DL (ref 30–150)
TSH SERPL-ACNC: 1.03 UIU/ML (ref 0.4–4)
UROBILINOGEN UR STRIP-ACNC: NEGATIVE EU/DL
WBC # BLD AUTO: 5.69 K/UL (ref 3.9–12.7)

## 2025-07-23 PROCEDURE — 83690 ASSAY OF LIPASE: CPT | Mod: PN

## 2025-07-23 PROCEDURE — 85025 COMPLETE CBC W/AUTO DIFF WBC: CPT | Mod: PN

## 2025-07-23 PROCEDURE — 84153 ASSAY OF PSA TOTAL: CPT | Mod: PN

## 2025-07-23 PROCEDURE — 3066F NEPHROPATHY DOC TX: CPT | Mod: CPTII,S$GLB,, | Performed by: PHYSICIAN ASSISTANT

## 2025-07-23 PROCEDURE — 82570 ASSAY OF URINE CREATININE: CPT | Mod: PN

## 2025-07-23 PROCEDURE — 3008F BODY MASS INDEX DOCD: CPT | Mod: CPTII,S$GLB,, | Performed by: PHYSICIAN ASSISTANT

## 2025-07-23 PROCEDURE — 81003 URINALYSIS AUTO W/O SCOPE: CPT | Mod: PN

## 2025-07-23 PROCEDURE — 3061F NEG MICROALBUMINURIA REV: CPT | Mod: CPTII,S$GLB,, | Performed by: PHYSICIAN ASSISTANT

## 2025-07-23 PROCEDURE — 80061 LIPID PANEL: CPT | Mod: PN

## 2025-07-23 PROCEDURE — 83036 HEMOGLOBIN GLYCOSYLATED A1C: CPT | Mod: PN

## 2025-07-23 PROCEDURE — 1160F RVW MEDS BY RX/DR IN RCRD: CPT | Mod: CPTII,S$GLB,, | Performed by: PHYSICIAN ASSISTANT

## 2025-07-23 PROCEDURE — 1159F MED LIST DOCD IN RCRD: CPT | Mod: CPTII,S$GLB,, | Performed by: PHYSICIAN ASSISTANT

## 2025-07-23 PROCEDURE — 99214 OFFICE O/P EST MOD 30 MIN: CPT | Mod: S$GLB,,, | Performed by: PHYSICIAN ASSISTANT

## 2025-07-23 PROCEDURE — 87389 HIV-1 AG W/HIV-1&-2 AB AG IA: CPT | Mod: PN

## 2025-07-23 PROCEDURE — 3075F SYST BP GE 130 - 139MM HG: CPT | Mod: CPTII,S$GLB,, | Performed by: PHYSICIAN ASSISTANT

## 2025-07-23 PROCEDURE — 84443 ASSAY THYROID STIM HORMONE: CPT | Mod: PN

## 2025-07-23 PROCEDURE — 36415 COLL VENOUS BLD VENIPUNCTURE: CPT | Mod: PN

## 2025-07-23 PROCEDURE — 3078F DIAST BP <80 MM HG: CPT | Mod: CPTII,S$GLB,, | Performed by: PHYSICIAN ASSISTANT

## 2025-07-23 PROCEDURE — 84075 ASSAY ALKALINE PHOSPHATASE: CPT | Mod: PN

## 2025-07-23 PROCEDURE — 3044F HG A1C LEVEL LT 7.0%: CPT | Mod: CPTII,S$GLB,, | Performed by: PHYSICIAN ASSISTANT

## 2025-07-23 NOTE — PROGRESS NOTES
Patient ID: Kike Swartz is a 59 y.o. male.     Chief Complaint: No chief complaint on file.    Mr. Swartz is a 59 year old male with history of HTN, hyperglycemia who presents today for abdominal discomfort. Reports he is also due for annual, needs labs.    He reports abdominal discomfort occurring 30-60 minutes after eating, described as feeling like bad gas that takes a few hours to pass. He experiences upper abdominal pain and intermittent loose stools. The discomfort is most pronounced after meals, with gas causing significant distress and requiring several hours to resolve.    He reports frequent urination primarily at night, specifically awakening from sleep to urinate. He denies frequent urination during daytime hours.    He has a history of hyperlipidemia, hypertension, and ongoing back issues. He denies personal history of prostate problems.    Family history is significant for prostate cancer.    He is a former smoker, having quit approximately 25 years ago. He is also a former alcohol user, having discontinued drinking last year. Prior to cessation, he consumed approximately six beers daily.         Review of Systems  Review of Systems   Constitutional:  Negative for fever.   HENT:  Negative for ear pain and sinus pain.    Eyes:  Negative for discharge.   Respiratory:  Negative for cough and wheezing.    Cardiovascular:  Negative for chest pain and leg swelling.   Gastrointestinal:  Positive for abdominal pain. Negative for diarrhea, nausea and vomiting.   Genitourinary:  Negative for urgency.   Musculoskeletal:  Negative for myalgias.   Skin:  Negative for rash.   Neurological:  Negative for weakness and headaches.   Psychiatric/Behavioral:  Negative for depression.        Currently Medications  Medications Ordered Prior to Encounter[1]    Physical  Exam  Vitals:    07/23/25 1317   BP: 132/72   BP Location: Left arm   Patient Position: Sitting   Pulse: 71   Temp: 98.1 °F (36.7 °C)   SpO2: 98%  "  Weight: 87.8 kg (193 lb 7.3 oz)   Height: 6' 2" (1.88 m)      Body mass index is 24.84 kg/m².  Wt Readings from Last 3 Encounters:   07/23/25 87.8 kg (193 lb 7.3 oz)   10/28/24 91 kg (200 lb 11.7 oz)   04/15/24 90.3 kg (199 lb 1.2 oz)       Physical Exam  Vitals and nursing note reviewed.   Constitutional:       General: He is not in acute distress.     Appearance: He is not ill-appearing.   HENT:      Head: Normocephalic and atraumatic.      Right Ear: External ear normal.      Left Ear: External ear normal.      Nose: Nose normal.      Mouth/Throat:      Mouth: Mucous membranes are moist.   Eyes:      Extraocular Movements: Extraocular movements intact.      Conjunctiva/sclera: Conjunctivae normal.   Cardiovascular:      Rate and Rhythm: Normal rate and regular rhythm.      Pulses: Normal pulses.      Heart sounds: No murmur heard.  Pulmonary:      Effort: Pulmonary effort is normal. No respiratory distress.      Breath sounds: No wheezing.   Abdominal:      General: Bowel sounds are normal. There is no distension.      Palpations: Abdomen is soft. There is no mass.      Tenderness: There is no abdominal tenderness.   Musculoskeletal:         General: No swelling.      Cervical back: Normal range of motion.   Skin:     Coloration: Skin is not jaundiced.      Findings: No rash.   Neurological:      General: No focal deficit present.      Mental Status: He is alert and oriented to person, place, and time.   Psychiatric:         Mood and Affect: Mood normal.         Thought Content: Thought content normal.         Labs:    Complete Blood Count  Lab Results   Component Value Date    RBC 4.74 07/23/2025    HGB 13.8 (L) 07/23/2025    HCT 42.4 07/23/2025    MCV 90 07/23/2025    MCH 29.1 07/23/2025    MCHC 32.5 07/23/2025    RDW 13.2 07/23/2025     07/23/2025    MPV 10.7 07/23/2025    GRAN 2.5 10/28/2024    GRAN 52.0 10/28/2024    LYMPH 29.5 07/23/2025    LYMPH 1.68 07/23/2025    MONO 8.6 07/23/2025    MONO 0.49 " 07/23/2025    EOS 2.8 07/23/2025    EOS 0.16 07/23/2025    BASO 0.03 10/28/2024    EOSINOPHIL 4.4 10/28/2024    BASOPHIL 0.7 07/23/2025    BASOPHIL 0.04 07/23/2025    DIFFMETHOD Automated 10/28/2024       Comprehensive Metabolic Panel  Lab Results   Component Value Date     07/23/2025    BUN 20 07/23/2025    CREATININE 1.1 07/23/2025     07/23/2025    K 4.4 07/23/2025     07/23/2025    PROT 8.9 (H) 07/23/2025    ALBUMIN 4.7 07/23/2025    BILITOT 0.8 07/23/2025    AST 25 07/23/2025    ALKPHOS 72 07/23/2025    CO2 31 (H) 07/23/2025    ALT 23 07/23/2025    ANIONGAP 9 07/23/2025       TSH  Lab Results   Component Value Date    TSH 1.030 07/23/2025       Imaging:  X-Ray Knee 3 View Left  Narrative: EXAMINATION:  XR KNEE 3 VIEW LEFT    CLINICAL HISTORY:  Pain in left knee    COMPARISON:  02/12/2015    FINDINGS:  No evidence of acute fracture or dislocation.  Bony mineralization is normal.  Soft tissues are unremarkable. Lateral view of the left knee demonstrates trace joint effusion.    Mild medial compartment narrowing and sclerosis.  Vascular calcifications noted  Impression: No acute fracture or dislocation.    Electronically signed by: Pepe Lloyd MD  Date:    04/15/2024  Time:    14:45      Assessment/Plan:    1. Epigastric abdominal pain  -     LIPASE; Future; Expected date: 07/23/2025  -     E-Consult to Gastroenterology  -     H. pylori antigen, stool; Future; Expected date: 07/23/2025    2. Primary hypertension  -     Comprehensive Metabolic Panel; Future; Expected date: 07/23/2025  -     TSH; Future; Expected date: 07/23/2025  -     Lipid Panel; Future; Expected date: 07/23/2025  -     CBC Auto Differential; Future; Expected date: 07/23/2025  -     Urinalysis; Future    3. History of alcohol abuse  -     LIPASE; Future; Expected date: 07/23/2025    4. Screening PSA (prostate specific antigen)  -     PSA, Screening; Future; Expected date: 07/23/2026    5. Screening for HIV (human  "immunodeficiency virus)  -     HIV 1/2 Ag/Ab (4th Gen); Future    6. Hyperglycemia  -     Hemoglobin A1C; Future; Expected date: 07/23/2025  -     Microalbumin/Creatinine Ratio, Urine; Future    7. Colon cancer screening  -     Ambulatory referral/consult to Endo Procedure ; Future; Expected date: 07/24/2025  -     E-Consult to Gastroenterology       Assessment & Plan    I10 Essential (primary) hypertension  E78.5 Hyperlipidemia, unspecified  M54.9 Dorsalgia, unspecified  K30 Functional dyspepsia  R35.1 Nocturia  R14.0 Abdominal distension (gaseous)  R10.84 Generalized abdominal pain  Z80.42 Family history of malignant neoplasm of prostate  Z87.891 Personal history of nicotine dependence  Z81.1 Family history of alcohol abuse and dependence    ESSENTIAL HYPERTENSION:  - Mr. Swartz has high blood pressure.    HYPERLIPIDEMIA:  - Mr. Swartz has high cholesterol.    BACK PAIN:  - Mr. Swartz has issues with back.    FUNCTIONAL DYSPEPSIA AND ABDOMINAL PAIN:  - Mr. Swartz experiences abdominal discomfort, distension, and pain after eating that lingers for a few hours, describing it as feeling like "bad gas." Physical exam of the abdomen revealed no elicited pain.  - Discussed possibility of H. pylori bacteria as potential cause of stomach issues including bloating, gas, and discomfort.  - Ordered stool sample for H. pylori testing with instructions for home collection.  - Also ordered upper GI endoscopy and colonoscopy for comprehensive investigation of GI symptoms.  - Prescribed Gas-X (simethicone) as needed for gas relief and Vino (alpha-galactosidase) to be taken before meals to reduce gas production.    NOCTURIA:  - Mr. Swartz experiences frequent urination mainly at night.  - Ordered urinalysis to check for diabetes and other causes of frequent urination.    FAMILY HISTORY OF PROSTATE CANCER:  - Mr. Swartz has family history of prostate cancer.  - Ordered comprehensive lab work including PSA.    HISTORY OF " NICOTINE DEPENDENCE:  - Mr. Swartz stopped smoking 25 years ago.    HISTORY OF ALCOHOL ABUSE:  - Mr. Swartz stopped drinking last year, used to drink a 6 pack a day.  - Ordered comprehensive lab work including HIV screening, liver enzyme test, and urinalysis.    FOLLOW-UP:  - Evaluated for annual check-up.  - Mr. Swartz to follow up in November for annual follow-up and contact the office when lab results are available.          Discussed how to stay healthy including: diet, exercise, refraining from smoking and discussed screening exams / tests needed for age, sex and family Hx.    This note was generated with the assistance of ambient listening technology. Verbal consent was obtained by the patient and accompanying visitor(s) for the recording of patient appointment to facilitate this note. I attest to having reviewed and edited the generated note for accuracy, though some syntax or spelling errors may persist. Please contact the author of this note for any clarification.       RTC every 3-6 months with PCP, or PRN      Dacia Schumacher PA-C             [1]   Current Outpatient Medications on File Prior to Visit   Medication Sig Dispense Refill    amLODIPine (NORVASC) 10 MG tablet Take 1 tablet (10 mg total) by mouth once daily. 90 tablet 3    atorvastatin (LIPITOR) 20 MG tablet Take 1 tablet (20 mg total) by mouth every evening. 90 tablet 3    cholecalciferol, vitamin D3, (VITAMIN D3) 5,000 unit Tab Take 1 tablet (5,000 Units total) by mouth once daily. 90 tablet 3    GARLIC ORAL Take by mouth.      metFORMIN (GLUCOPHAGE) 500 MG tablet Take 1 tablet (500 mg total) by mouth daily with breakfast. For DM 90 tablet 3    mometasone 0.1% (ELOCON) 0.1 % cream Apply topically once daily. 45 each 5    omega-3 fatty acids/fish oil (FISH OIL-OMEGA-3 FATTY ACIDS) 300-1,000 mg capsule Take by mouth once daily.      pantoprazole (PROTONIX) 40 MG tablet Take 1 tablet (40 mg total) by mouth once daily. Take 45 minutes before  breakfast 90 tablet 3    sildenafiL (VIAGRA) 100 MG tablet Take 1 tablet (100 mg total) by mouth daily as needed for Erectile Dysfunction. 30 tablet 2     No current facility-administered medications on file prior to visit.

## 2025-07-25 ENCOUNTER — E-CONSULT (OUTPATIENT)
Dept: GASTROENTEROLOGY | Facility: HOSPITAL | Age: 60
End: 2025-07-25
Payer: COMMERCIAL

## 2025-07-25 DIAGNOSIS — R10.13 EPIGASTRIC PAIN: Primary | ICD-10-CM

## 2025-07-25 DIAGNOSIS — Z12.11 SCREENING FOR MALIGNANT NEOPLASM OF COLON: ICD-10-CM

## 2025-07-25 DIAGNOSIS — R10.13 EPIGASTRIC ABDOMINAL PAIN: Primary | ICD-10-CM

## 2025-07-25 NOTE — CONSULTS
[facility]STROENTEROLOG[facility]  Response for E-Consult     Patient Name: Kike Swartz  MRN: [id number]  Primary Care Provider: Laith Marie MD   Requesting Provider: Dacia Schumacher PA-C  E-Consult to Gastroenterology  Consult performed by: Rodriguez Jernigan MD  Consult ordered by: Dacia Schumacher PA-C  Reason for consult: pt due for screening colon. Ordered via ref endo pro. Want to add on upper GI for ongoing bloating, abdominal distension, epigastric pain        Recommendation: EGD and colonoscopy    An Ambulatory Referral for an Endoscopy Order will be pended to the referring provider with the following:    Procedure: EGD/Colonoscopy    Diagnosis: Screening colonoscopy, epigastric abdominal pain    Procedure Timing: Within 12 weeks       Total time of Consultation: 5 minute    I did not speak to the requesting provider verbally about this.     *This eConsult is based on the clinical data available to me and is furnished without benefit of a physical examination. The eConsult will need to be interpreted in light of any clinical issues or changes in patient status not available to me at the time of filing this eConsults. Significant changes in patient condition or level of acuity should result in immediate formal consultation and reevaluation. Please alert me if you have further questions.    Thank you for this eConsult referral.     Rodriguez Jernigan MD  [facility]STRO[facility]OLOG[facility]

## 2025-07-28 ENCOUNTER — CLINICAL SUPPORT (OUTPATIENT)
Dept: ENDOSCOPY | Facility: HOSPITAL | Age: 60
End: 2025-07-28
Payer: COMMERCIAL

## 2025-07-28 ENCOUNTER — TELEPHONE (OUTPATIENT)
Dept: ENDOSCOPY | Facility: HOSPITAL | Age: 60
End: 2025-07-28

## 2025-07-28 VITALS — WEIGHT: 193 LBS | BODY MASS INDEX: 24.77 KG/M2 | HEIGHT: 74 IN

## 2025-07-28 DIAGNOSIS — Z12.11 COLON CANCER SCREENING: ICD-10-CM

## 2025-07-28 DIAGNOSIS — Z12.11 SPECIAL SCREENING FOR MALIGNANT NEOPLASMS, COLON: Primary | ICD-10-CM

## 2025-07-28 RX ORDER — SODIUM, POTASSIUM,MAG SULFATES 17.5-3.13G
1 SOLUTION, RECONSTITUTED, ORAL ORAL DAILY
Qty: 1 KIT | Refills: 0 | Status: SHIPPED | OUTPATIENT
Start: 2025-07-28 | End: 2025-07-30

## 2025-07-28 NOTE — PATIENT INSTRUCTIONS
[facility] Colonoscopy Prep and Procedure Instructions  SUPREP  Sodium Sulfate, Potassium Sulfate, and Magnesium Sulfate Oral Solution    Date of procedure: 08/21/2025 - Arrive at 01:35 PM  Location of Department:   [facility] [address] [place] [address]  Take the Elevators to 2nd Floor Endoscopy Procedural Area  Monday - Friday, please enter through the front door near [facility]. Then, proceed up the first set of elevators to the 2nd floor where you will check in at the endo registration desk.     Getting ready for your procedure:    If your procedure requires the administration of anesthesia, it is necessary for a responsible adult to drive you home. The designated adult is strongly encouraged to remain in the endoscopy area until the patient is discharged. (Medical Transportation, Uber, Lyft, Taxi, etc. may ONLY be used if a responsible adult is present to accompany you home. The responsible adult CANNOT be the  of the service.)   person must be available to return to pick you up within 15 minutes of being notified of discharge.  Please bring a picture ID, insurance card, & co-pay.  Leave all jewelry and valuables at home on the day of the procedure.  Do not follow the instructions that come in the prep box - use these instructions instead.    Medications:    If you begin taking any new blood thinning, weight loss, or diabetic medications please call Endoscopy Scheduling as soon as possible at [phone].  If you take heart, blood pressure, seizure, pain, (including inhalers/nebulizers), anti-rejection (transplant patients) or mental health medications, please take at your regular times with a sip of water.  If you are taking diabetic or weight loss medications, see the attached instructions.      COLONOSCOPY PREP TIMELINE    ONE WEEK PRIOR TO PROCEDURE   the prep kit and Dulcolax laxative tablets (bisacodyl 5mg - available over the counter)  from your local pharmacy.  Purchase clear liquids for use during the prep referencing the chart below.    CLEAR LIQUIDS NOT CLEAR LIQUIDS (DO NOT DRINK)   Water X Milk   Clear broth (chicken, beef, or vegetable) X Smoothies   Apple Juice (no pulp) X Hot Chocolate   White grape juice X Juices with pulp (orange juice, prune juice)   Sports drinks (AVOID red, purple, or blue) X Coffee WITH cream or milk   Clear soda X Beverages with particles (shakes, milkshakes)   Tea or coffee (black, NO MILK)    Clear gelatin (Jell-o, AVOID red, purple, or blue)      THE DAY BEFORE YOUR PROCEDURE: 08/20/2025    Consume ONLY CLEAR LIQUIDS for the entire day.  Stay hydrated.  NO SOLID FOOD    2:00 PM - Take four (4) Dulcolax laxative (bisacodyl 5mg) tablets with at least one full glass of clear liquids.      6:00 - 7:00 PM  Pour ONE (1) 6-ounce bottle of SUPREP liquid into the mixing container.  Add 10 ounces of cool drinking water to the 16-ounce line on the container and mix.  Drink ALL the liquid in the container.  You must drink two (2) more 16-ounce containers of water over the next 1 hour.  If you experience nausea, bloating, or cramping, slow down drinking until your symptoms decrease.    THE DAY OF YOUR PROCEDURE: 08/21/2025    2:00 - 3:00 AM  Pour ONE (1) 6-ounce bottle of SUPREP liquid into the mixing container.  Add 10 ounces of cool drinking water to the 16-ounce line on the container and mix.  Drink ALL the liquid in the container.  You must drink two (2) more 16-ounce containers of water over the next 1 hour.    ** May continue to drink clear liquids until 4 hours prior to arrival time for procedure **  Who to call if you have questions:    To reschedule / cancel, or for prep questions: [phone] / Hours of operation Monday-Friday 8:00 AM-4:30 PM  Insurance or financial information: [phone] or [phone]  After hours concerns, call [facility] Line at [phone] or [phone]      Please  watch this informational video:  [email or web address]              Please contact your Diabetes Care Provider if you have questions or are preparing for more than one day before your procedure. For any scheduling questions, contact the Endoscopy Schedulers at [phone]. Please disregard this guide if you do not take any of these medications.   Weight loss and Diabetes Medication Holding Instructions:         Oral Medicine   Day before procedure Day of Procedure            Glyburide       Do NOT take morning of procedure. If you        Glucotrol (Glipizide)   Do NOT take   take twice daily, take with dinner.        Amaryl (Glimepiride)                                     Glucophage       Do NOT take morning of procedure. Take        Glumetza   Take as   when you start eating again.          Fortamet (Metformin)   prescribed                                 Januvia (Sitaglipitin)       Do NOT take morning of procedure. Take        Nesina (Aloglipitin)   Take as   when you start eating again.          Onglyza (Saxaglipitin)   prescribed                  Tradjenta (Linaglipitin)                                     Invokana (canagliflozin)                    Farxiga (dapagliflozin)   see prep    Do NOT take morning of procedure. Take        Jardiance (empagliflozin) instructions   when you start eating again.          Steglatro (ertugliflozin)   for date of                  Brenzavvy (bexagliflozin) last dose                  Inpefa (sotagliflozin)                      Invokamet/Invokamet XR (Invokana + metformin)                    Xigduo (Farxiga + metformin)                    Synjardy  XR (Jardiance + metformin)                    Segluromet (Steglatro + metformin)                    Qtern (Farxiga + saxagliptin)                    Glyxambi (Jardiance + linagliptin)                    Steglujan (Steglatro + sitagliptin)                                   Actos (Pioglitazone)   Take as   Do NOT  take morning of procedure. Take            prescribed.   when you start eating again.                         Rybelsus (semaglutide) Take as    Do NOT take morning of procedure. Take             prescribed.   when you start eating again.                          Adipex/Lomaria (phentermine) see prep   Do NOT take morning of procedure. Take        Qsymia (phentermine  + topiramate) instructions   when you start eating again.              for date of                      last dose                               Tenuate/Tepanil (diethylpropion) see prep   Do NOT take morning of procedure. Take           instructions   when you start eating again.             for date of                     last dose                              Xenical (orilstat)   see prep    Do NOT take morning of procedure. Take           instructions   when you start eating again.             for date of                     last dose                                Injectable & Combination Day before Procedure Day of Procedure            Medicine (taken daily)                      Victoza/Saxenda (liragluide) Take as prescribed Do NOT take morning of procedure. Take        Byetta (exenatide)       when you start eating again.          Gattex (teduglutide)                      Adlyxin (lixisenatide) *                      Soliqua (lixisenatide + insulin glargine)                    Xultophy (liraglutide + insulin degludec)                                    Injectable  Medicine   Day before Procedure Day of Procedure             (taken weekly)                      Bydureon bcise (exenatide ER) see prep   Do NOT take morning of procedure. Take         Mounjaro/Zepbound (tirzepatide) instructions   when you start eating again.          Ozempic/Wegovy (semaglutide) for date of                  Tanzeum (albiglutide) * last dose                  Trulicity (dulaglutide)                      It is important to monitor your blood sugar while doing the bowel  preparation. On the day of your bowel prep, when you are on a clear liquid diet, you may drink beverages with sugar as your source of glucose. Be sure to mix the prep with water or sugar free liquid only. Below are instructions on how to adjust your diabetic medications prior to your scheduled procedure. Call the healthcare provider who manages your diabetes if you have questions.      Insulin for Type 1 Diabetes Mellitus   Day before Procedure                                        Day of procedure         Basaglar                         If you use in the morning, take as prescribed.    If you take in the morning,       Lantus                         If you use in the evening, inject 70% of dose.   inject 80% of dose. If you take      Levemir           in the evenings, inject usual       Toujeo           dose.           Sarkis Zaldivar                      Count carbs and adjust dose           Do NOT take morining of procedure.       Apidra                      accordingly. If not carb counting,         Take when you start eating again.       Humalog 100                      take 25% of usual meal dose.                  Humalog 200                      May use correction dose every                 Novolog                      4 hours. Do NOT use once sugar-free                                       liquid prep is started.                                 Insulin Pump                     SEE BELOW PUMP GUIDANCE                                                                                                                Insulin for Type 2 Diabetes Mellitus   Day before Procedure                                        Day of procedure         Basaglar                       If you use in the morning, take as prescribed.    If you take in the morning,       Lantus                       If you use in the evening, inject 70% of dose.   inject 80% of dose.  If you take      Levemir           in the evenings, inject usual       Toujeo           dose.           Tresiba                                                            Afrezza                       Inject 50 % of dose with clear liquid diet.      Do NOT take morning of       Apidra                      Do NOT use once sugar-free clear liquid prep   procedure. Take when you       Fiasp                      is started. If you are using a correction scale,    start eating meals again.       Humalog 100                      take dose every 4 hours as needed.                 Humalog 200                      Novolog                                     NPH                        Inject 50% of breakfast and dinner      Do NOT take morning of                             doses with clear liquid diet.        procedure. Take usual dose                  when you eat dinner.                      Regular                       Inject 50% of breakfast dose and do NOT take   Do NOT take morning of                             dinner dose once sugar-free liquid prep has    procedure. Take usual dose                             started. If using correction scale, may take dose   when you eat dinner.                             every 6 hours as needed.                                  Novolog Mix 70/30                     Inject 50% of breakfast dose. Inject 25% of dinner dose.     Do NOT take breakfast dose.       Humolog Mix 75/25                              Take usual dose when you eat       Humolog Mix 50/50           dinner.                          U500                      Take 50% of AM or breakfast unit sara dose.   Do NOT take mornining of                              Take 25% of lunch or dinner or PM unit sara dose.    procedure. Take when you                   start eating again.                        V-Go                       Continue to wear your V-Go device. Do NOT click once sugar-free   clear liquid prep is  "started.   Continue to wear your V-Go                                device. Resume clicks with                   meals.                                         INSULIN PUMP GUIDANCE Day before Procedure Day of Procedure   Pump and CGM do not communicate      Count carbs and bolus for carbs and correction as needed. May use temp basal rate of 70% once sugar-free clear prep is started. Continue until after procedure the following day Continue use of 70% temp basal rate until procedure complete and you are eating normally        Pump and CGM do communicate     Tandem Tslim and Mobi Count carbs and bolus for carbs and correction as needed. Start "Exercise" mode once sugar-free clear prep is started. Continue until after procedure the following day Continue use of "Exercise" mode until procedure complete and you are eating normally        Omnipod 5 Count carbs and bolus for carbs and correction as needed. Start "Activity" mode once sugar-free clear prep is started. Continue until after procedure the following day Continue use of "Activity" mode until procedure complete and you are eating normally        Medtronic 670g/770g/780g Count carbs and bolus for carbs and correction as needed. Start "Temp target" mode once sugar-free clear prep is started. Continue until after procedure the following day Continue use of temp target until procedure complete and you are eating normally             Beta Bionic ilet Count carbs and bolus for carbs and correction as needed. Let pump run as you usually would Let pump run as you usually would       "

## 2025-07-28 NOTE — PLAN OF CARE
Patient is scheduled for a Colonoscopy on 08/21/2025 with Dr. ANDREW Galindo  Referral for procedure from PAT appointment

## 2025-08-07 DIAGNOSIS — K21.9 GASTROESOPHAGEAL REFLUX DISEASE WITHOUT ESOPHAGITIS: ICD-10-CM

## 2025-08-08 RX ORDER — PANTOPRAZOLE SODIUM 40 MG/1
40 TABLET, DELAYED RELEASE ORAL DAILY
Qty: 90 TABLET | Refills: 3 | Status: SHIPPED | OUTPATIENT
Start: 2025-08-08

## 2025-08-20 ENCOUNTER — ANESTHESIA EVENT (OUTPATIENT)
Dept: ENDOSCOPY | Facility: HOSPITAL | Age: 60
End: 2025-08-20
Payer: COMMERCIAL

## 2025-08-20 RX ORDER — LIDOCAINE HYDROCHLORIDE 10 MG/ML
1 INJECTION, SOLUTION EPIDURAL; INFILTRATION; INTRACAUDAL; PERINEURAL ONCE
OUTPATIENT
Start: 2025-08-20 | End: 2025-08-20

## 2025-08-20 RX ORDER — SODIUM CHLORIDE 9 MG/ML
INJECTION, SOLUTION INTRAVENOUS CONTINUOUS
OUTPATIENT
Start: 2025-08-20

## 2025-08-21 ENCOUNTER — ANESTHESIA (OUTPATIENT)
Dept: ENDOSCOPY | Facility: HOSPITAL | Age: 60
End: 2025-08-21
Payer: COMMERCIAL

## 2025-08-21 ENCOUNTER — HOSPITAL ENCOUNTER (OUTPATIENT)
Facility: HOSPITAL | Age: 60
Discharge: HOME | End: 2025-08-21
Attending: INTERNAL MEDICINE | Admitting: INTERNAL MEDICINE
Payer: COMMERCIAL

## 2025-08-21 VITALS
RESPIRATION RATE: 18 BRPM | DIASTOLIC BLOOD PRESSURE: 68 MMHG | TEMPERATURE: 98 F | SYSTOLIC BLOOD PRESSURE: 142 MMHG | HEART RATE: 61 BPM | OXYGEN SATURATION: 98 %

## 2025-08-21 DIAGNOSIS — Z12.11 COLON CANCER SCREENING: Primary | ICD-10-CM

## 2025-08-21 DIAGNOSIS — K21.9 GASTROESOPHAGEAL REFLUX DISEASE WITHOUT ESOPHAGITIS: ICD-10-CM

## 2025-08-21 LAB — POCT GLUCOSE: 101 MG/DL (ref 70–110)

## 2025-08-21 PROCEDURE — 27201012 HC FORCEPS, HOT/COLD, DISP: Performed by: INTERNAL MEDICINE

## 2025-08-21 PROCEDURE — 37000009 HC ANESTHESIA EA ADD 15 MINS: Performed by: INTERNAL MEDICINE

## 2025-08-21 PROCEDURE — 25000003 PHARM REV CODE 250: Performed by: STUDENT IN AN ORGANIZED HEALTH CARE EDUCATION/TRAINING PROGRAM

## 2025-08-21 PROCEDURE — 45380 COLONOSCOPY AND BIOPSY: CPT | Mod: 33,,, | Performed by: INTERNAL MEDICINE

## 2025-08-21 PROCEDURE — 63600175 PHARM REV CODE 636 W HCPCS: Performed by: STUDENT IN AN ORGANIZED HEALTH CARE EDUCATION/TRAINING PROGRAM

## 2025-08-21 PROCEDURE — 43239 EGD BIOPSY SINGLE/MULTIPLE: CPT | Performed by: INTERNAL MEDICINE

## 2025-08-21 PROCEDURE — 45380 COLONOSCOPY AND BIOPSY: CPT | Mod: 33 | Performed by: INTERNAL MEDICINE

## 2025-08-21 PROCEDURE — 88305 TISSUE EXAM BY PATHOLOGIST: CPT | Mod: TC,91 | Performed by: INTERNAL MEDICINE

## 2025-08-21 PROCEDURE — 37000008 HC ANESTHESIA 1ST 15 MINUTES: Performed by: INTERNAL MEDICINE

## 2025-08-21 PROCEDURE — 43239 EGD BIOPSY SINGLE/MULTIPLE: CPT | Mod: 51,,, | Performed by: INTERNAL MEDICINE

## 2025-08-21 PROCEDURE — 88305 TISSUE EXAM BY PATHOLOGIST: CPT | Mod: 26,,, | Performed by: PATHOLOGY

## 2025-08-21 RX ORDER — LIDOCAINE HYDROCHLORIDE 20 MG/ML
INJECTION, SOLUTION EPIDURAL; INFILTRATION; INTRACAUDAL; PERINEURAL
Status: DISCONTINUED
Start: 2025-08-21 | End: 2025-08-21 | Stop reason: HOSPADM

## 2025-08-21 RX ORDER — PROPOFOL 10 MG/ML
VIAL (ML) INTRAVENOUS
Status: DISCONTINUED | OUTPATIENT
Start: 2025-08-21 | End: 2025-08-24

## 2025-08-21 RX ORDER — PROPOFOL 10 MG/ML
VIAL (ML) INTRAVENOUS
Status: DISCONTINUED
Start: 2025-08-21 | End: 2025-08-21 | Stop reason: HOSPADM

## 2025-08-21 RX ORDER — LIDOCAINE HYDROCHLORIDE 20 MG/ML
INJECTION INTRAVENOUS
Status: DISCONTINUED | OUTPATIENT
Start: 2025-08-21 | End: 2025-08-24

## 2025-08-21 RX ORDER — PANTOPRAZOLE SODIUM 40 MG/1
40 TABLET, DELAYED RELEASE ORAL 2 TIMES DAILY
Qty: 180 TABLET | Refills: 3 | Status: SHIPPED | OUTPATIENT
Start: 2025-08-21

## 2025-08-21 RX ORDER — PROPOFOL 10 MG/ML
VIAL (ML) INTRAVENOUS
Status: COMPLETED
Start: 2025-08-21 | End: 2025-08-21

## 2025-08-21 RX ORDER — ESMOLOL HYDROCHLORIDE 10 MG/ML
INJECTION INTRAVENOUS
Status: DISCONTINUED
Start: 2025-08-21 | End: 2025-08-21 | Stop reason: HOSPADM

## 2025-08-21 RX ADMIN — PROPOFOL 50 MG: 10 INJECTION, EMULSION INTRAVENOUS at 03:08

## 2025-08-21 RX ADMIN — PROPOFOL 30 MG: 10 INJECTION, EMULSION INTRAVENOUS at 03:08

## 2025-08-21 RX ADMIN — PROPOFOL 50 MG: 10 INJECTION, EMULSION INTRAVENOUS at 02:08

## 2025-08-21 RX ADMIN — LIDOCAINE HYDROCHLORIDE 100 MG: 20 INJECTION, SOLUTION INTRAVENOUS at 02:08

## 2025-08-21 RX ADMIN — PROPOFOL 60 MG: 10 INJECTION, EMULSION INTRAVENOUS at 03:08

## 2025-08-21 RX ADMIN — PROPOFOL 100 MG: 10 INJECTION, EMULSION INTRAVENOUS at 02:08

## 2025-08-21 RX ADMIN — SODIUM CHLORIDE: 0.9 INJECTION, SOLUTION INTRAVENOUS at 02:08

## 2025-08-25 LAB
ESTROGEN SERPL-MCNC: NORMAL PG/ML
INSULIN SERPL-ACNC: NORMAL U[IU]/ML
LAB AP CLINICAL INFORMATION: NORMAL
LAB AP GROSS DESCRIPTION: NORMAL
LAB AP PERFORMING LOCATION(S): NORMAL
LAB AP REPORT FOOTNOTES: NORMAL

## 2025-08-26 ENCOUNTER — PATIENT MESSAGE (OUTPATIENT)
Dept: FAMILY MEDICINE | Facility: CLINIC | Age: 60
End: 2025-08-26
Payer: COMMERCIAL

## 2025-08-26 ENCOUNTER — TELEPHONE (OUTPATIENT)
Dept: FAMILY MEDICINE | Facility: CLINIC | Age: 60
End: 2025-08-26
Payer: COMMERCIAL

## 2025-09-06 RX ORDER — SILDENAFIL 100 MG/1
100 TABLET, FILM COATED ORAL DAILY PRN
Qty: 30 TABLET | Refills: 2 | Status: SHIPPED | OUTPATIENT
Start: 2025-09-06 | End: 2026-09-06